# Patient Record
Sex: FEMALE | Race: WHITE | NOT HISPANIC OR LATINO | Employment: FULL TIME | URBAN - METROPOLITAN AREA
[De-identification: names, ages, dates, MRNs, and addresses within clinical notes are randomized per-mention and may not be internally consistent; named-entity substitution may affect disease eponyms.]

---

## 2023-03-09 ENCOUNTER — TELEPHONE (OUTPATIENT)
Dept: OBSTETRICS AND GYNECOLOGY | Facility: CLINIC | Age: 58
End: 2023-03-09
Payer: COMMERCIAL

## 2023-03-09 DIAGNOSIS — Z12.31 BREAST CANCER SCREENING BY MAMMOGRAM: Primary | ICD-10-CM

## 2023-03-09 DIAGNOSIS — Z01.419 WELL WOMAN EXAM WITH ROUTINE GYNECOLOGICAL EXAM: ICD-10-CM

## 2023-03-09 NOTE — TELEPHONE ENCOUNTER
----- Message from Jodee Hernandez sent at 3/9/2023  3:44 PM CST -----  Contact: Rhea Wilkerson is a new patient and would like to have orders for a mammogram and labs be placed in [facility] so she can schedule at the [facility] please.    Rhea can be reached at [phone] (home)      Thanks

## 2023-04-03 ENCOUNTER — OFFICE VISIT (OUTPATIENT)
Dept: OBSTETRICS AND GYNECOLOGY | Facility: CLINIC | Age: 58
End: 2023-04-03
Payer: COMMERCIAL

## 2023-04-03 VITALS
HEIGHT: 67 IN | DIASTOLIC BLOOD PRESSURE: 72 MMHG | SYSTOLIC BLOOD PRESSURE: 122 MMHG | BODY MASS INDEX: 31.11 KG/M2 | WEIGHT: 198.19 LBS

## 2023-04-03 DIAGNOSIS — N81.6 RECTOCELE: ICD-10-CM

## 2023-04-03 DIAGNOSIS — Z01.419 WELL WOMAN EXAM WITH ROUTINE GYNECOLOGICAL EXAM: Primary | ICD-10-CM

## 2023-04-03 PROCEDURE — 3008F BODY MASS INDEX DOCD: CPT | Mod: CPTII,S$GLB,, | Performed by: OBSTETRICS & GYNECOLOGY

## 2023-04-03 PROCEDURE — 3078F DIAST BP <80 MM HG: CPT | Mod: CPTII,S$GLB,, | Performed by: OBSTETRICS & GYNECOLOGY

## 2023-04-03 PROCEDURE — 99999 PR PBB SHADOW E&M-EST. PATIENT-LVL III: ICD-10-PCS | Mod: PBBFAC,,, | Performed by: OBSTETRICS & GYNECOLOGY

## 2023-04-03 PROCEDURE — 88175 CYTOPATH C/V AUTO FLUID REDO: CPT | Performed by: OBSTETRICS & GYNECOLOGY

## 2023-04-03 PROCEDURE — 1159F PR MEDICATION LIST DOCUMENTED IN MEDICAL RECORD: ICD-10-PCS | Mod: CPTII,S$GLB,, | Performed by: OBSTETRICS & GYNECOLOGY

## 2023-04-03 PROCEDURE — 1160F RVW MEDS BY RX/DR IN RCRD: CPT | Mod: CPTII,S$GLB,, | Performed by: OBSTETRICS & GYNECOLOGY

## 2023-04-03 PROCEDURE — 3074F PR MOST RECENT SYSTOLIC BLOOD PRESSURE < 130 MM HG: ICD-10-PCS | Mod: CPTII,S$GLB,, | Performed by: OBSTETRICS & GYNECOLOGY

## 2023-04-03 PROCEDURE — 87624 HPV HI-RISK TYP POOLED RSLT: CPT | Performed by: OBSTETRICS & GYNECOLOGY

## 2023-04-03 PROCEDURE — 99386 PREV VISIT NEW AGE 40-64: CPT | Mod: S$GLB,,, | Performed by: OBSTETRICS & GYNECOLOGY

## 2023-04-03 PROCEDURE — 99386 PR PREVENTIVE VISIT,NEW,40-64: ICD-10-PCS | Mod: S$GLB,,, | Performed by: OBSTETRICS & GYNECOLOGY

## 2023-04-03 PROCEDURE — 3008F PR BODY MASS INDEX (BMI) DOCUMENTED: ICD-10-PCS | Mod: CPTII,S$GLB,, | Performed by: OBSTETRICS & GYNECOLOGY

## 2023-04-03 PROCEDURE — 1160F PR REVIEW ALL MEDS BY PRESCRIBER/CLIN PHARMACIST DOCUMENTED: ICD-10-PCS | Mod: CPTII,S$GLB,, | Performed by: OBSTETRICS & GYNECOLOGY

## 2023-04-03 PROCEDURE — 3078F PR MOST RECENT DIASTOLIC BLOOD PRESSURE < 80 MM HG: ICD-10-PCS | Mod: CPTII,S$GLB,, | Performed by: OBSTETRICS & GYNECOLOGY

## 2023-04-03 PROCEDURE — 99999 PR PBB SHADOW E&M-EST. PATIENT-LVL III: CPT | Mod: PBBFAC,,, | Performed by: OBSTETRICS & GYNECOLOGY

## 2023-04-03 PROCEDURE — 3074F SYST BP LT 130 MM HG: CPT | Mod: CPTII,S$GLB,, | Performed by: OBSTETRICS & GYNECOLOGY

## 2023-04-03 PROCEDURE — 1159F MED LIST DOCD IN RCRD: CPT | Mod: CPTII,S$GLB,, | Performed by: OBSTETRICS & GYNECOLOGY

## 2023-04-03 RX ORDER — AZELASTINE 1 MG/ML
SPRAY, METERED NASAL
COMMUNITY
Start: 2023-03-10 | End: 2023-05-22

## 2023-04-03 RX ORDER — LEVOTHYROXINE SODIUM 75 UG/1
TABLET ORAL
COMMUNITY

## 2023-04-03 RX ORDER — ESTRADIOL 0.1 MG/G
CREAM VAGINAL
COMMUNITY
Start: 2023-02-16 | End: 2023-05-22

## 2023-04-03 RX ORDER — TRETINOIN 0.25 MG/G
CREAM TOPICAL
COMMUNITY

## 2023-04-03 RX ORDER — FLUTICASONE PROPIONATE 50 MCG
SPRAY, SUSPENSION (ML) NASAL
COMMUNITY
Start: 2023-03-10 | End: 2023-05-22

## 2023-04-03 NOTE — PROGRESS NOTES
Subjective:      Patient ID: Rhea Archuleta is a 57 y.o. female.    Chief Complaint:  Annual Exam      History of Present Illness  Annual Exam-Postmenopausal  Patient presents for annual exam. The patient reports history of vaginal prolapse. The patient is not sexually active. GYN screening history: last pap: was normal and last mammogram: was normal. History of LEEP and vulvar surgery secondary to MARK/NEELA over 10 yrs ago.  Was recently diagnosed with an anal/rectal fistula that resolved with antibiotics.  The patient is not taking hormone replacement therapy. Patient denies post-menopausal vaginal bleeding. The patient wears seatbelts: yes. The patient participates in regular exercise: yes. Has the patient ever been transfused or tattooed?: no. The patient reports that there is not domestic violence in her life.    GYN & OB History  No LMP recorded. Patient is postmenopausal.   Date of Last Pap: No result found    OB History    Para Term  AB Living   2 2 2     2   SAB IAB Ectopic Multiple Live Births                  # Outcome Date GA Lbr Luke/2nd Weight Sex Delivery Anes PTL Lv   2 Term            1 Term                Review of Systems  Review of Systems   Constitutional:  Negative for activity change, appetite change, chills, fatigue, fever and unexpected weight change.   Respiratory:  Negative for shortness of breath.    Cardiovascular:  Negative for chest pain, palpitations and leg swelling.   Gastrointestinal:  Negative for abdominal pain, bloating, blood in stool, constipation, diarrhea, nausea and vomiting.   Genitourinary:  Positive for frequency. Negative for dysuria, flank pain, genital sores, hematuria, hot flashes, menorrhagia, pelvic pain, urgency, vaginal bleeding, vaginal discharge, vaginal pain, urinary incontinence, postmenopausal bleeding, vaginal dryness and vaginal odor.   Musculoskeletal:  Negative for back pain.   Integumentary:  Negative for rash, breast mass, nipple  discharge, breast skin changes and breast tenderness.   Neurological:  Negative for syncope and headaches.   Breast: Negative for asymmetry, lump, mass, mastodynia, nipple discharge, skin changes and tenderness       Objective:     Physical Exam:   Constitutional: She is oriented to person, place, and time. She appears well-developed and well-nourished. No distress.    HENT:   Head: Normocephalic and atraumatic.    Eyes: Pupils are equal, round, and reactive to light. EOM are normal.     Cardiovascular:  Normal rate, regular rhythm and normal heart sounds.             Pulmonary/Chest: Effort normal and breath sounds normal.        Abdominal: Soft. Bowel sounds are normal. She exhibits no distension. There is no abdominal tenderness.     Genitourinary:    Vagina, uterus, right adnexa and left adnexa normal.            Pelvic exam was performed with patient supine.   There is no rash, tenderness, lesion or injury on the right labia. There is no rash, tenderness, lesion or injury on the left labia. Cervix is normal. Right adnexum displays no mass, no tenderness and no fullness. Left adnexum displays no mass, no tenderness and no fullness. There is rectocele (grade 2) and cystocele (grade 1) in the vagina. No erythema,  no vaginal discharge, tenderness or bleeding in the vagina.    No foreign body in the vagina.      No signs of injury in the vagina.   Cervix exhibits no motion tenderness, no discharge and no friability. Uterus is not deviated, not enlarged and not tender.           Musculoskeletal: Normal range of motion and moves all extremeties. No tenderness or edema.       Neurological: She is alert and oriented to person, place, and time.    Skin: Skin is warm and dry.    Psychiatric: She has a normal mood and affect. Her behavior is normal. Thought content normal.       Assessment:     1. Well woman exam with routine gynecological exam    2. Rectocele             Plan:     Well woman exam with routine  gynecological exam  -     Liquid-Based Pap Smear, Screening  -     HPV High Risk Genotypes, PCR  -     Pt was counseled on cervical/vaginal screening guidelines and recommendations.  If today's pap smear result is negative, next pap smear will be due in 3-5 yrs.  -     Pt was advised on current breast cancer screening recommendations.  Pt desires to proceed with screening MMG.  -     Follow up with PCP for routine health maintenance needs.    Rectocele  -     Grade 2 with very thin perineal body (secondary to tissue removal secondary to NEELA).  Pt counseled on options.  Desires to proceed with expectant management.      Follow up in about 1 year (around 4/3/2024).

## 2023-04-04 ENCOUNTER — TELEPHONE (OUTPATIENT)
Dept: OBSTETRICS AND GYNECOLOGY | Facility: CLINIC | Age: 58
End: 2023-04-04
Payer: COMMERCIAL

## 2023-04-04 DIAGNOSIS — N81.6 RECTOCELE: Primary | ICD-10-CM

## 2023-04-04 NOTE — TELEPHONE ENCOUNTER
"Called patient and she stated Dr. Gamez wanted to send her to someone in [facility] yesterday and she stated "I want to hold off".  Patient has changed her mind and would like to see provider in [facility].  Advised patient message would be sent to provider.  "

## 2023-04-04 NOTE — TELEPHONE ENCOUNTER
----- Message from Graciela Lowe sent at 4/4/2023 11:21 AM CDT -----  Patient is requesting the information for the doctor,  advised she would get referred to ,Please call back at [phone].Thanks

## 2023-04-11 LAB
HPV HR 12 DNA SPEC QL NAA+PROBE: NEGATIVE
HPV16 AG SPEC QL: NEGATIVE
HPV18 DNA SPEC QL NAA+PROBE: NEGATIVE

## 2023-04-14 ENCOUNTER — PATIENT MESSAGE (OUTPATIENT)
Dept: OBSTETRICS AND GYNECOLOGY | Facility: CLINIC | Age: 58
End: 2023-04-14
Payer: COMMERCIAL

## 2023-04-14 LAB
FINAL PATHOLOGIC DIAGNOSIS: NORMAL
Lab: NORMAL

## 2023-04-17 ENCOUNTER — HOSPITAL ENCOUNTER (OUTPATIENT)
Dept: RADIOLOGY | Facility: HOSPITAL | Age: 58
Discharge: HOME | End: 2023-04-17
Attending: OBSTETRICS & GYNECOLOGY
Payer: COMMERCIAL

## 2023-04-17 DIAGNOSIS — Z01.419 WELL WOMAN EXAM WITH ROUTINE GYNECOLOGICAL EXAM: ICD-10-CM

## 2023-04-17 DIAGNOSIS — Z12.31 BREAST CANCER SCREENING BY MAMMOGRAM: ICD-10-CM

## 2023-04-17 PROCEDURE — 77067 SCR MAMMO BI INCL CAD: CPT | Mod: TC

## 2023-04-17 PROCEDURE — 77063 MAMMO DIGITAL SCREENING BILAT WITH TOMO: ICD-10-PCS | Mod: 26,,, | Performed by: RADIOLOGY

## 2023-04-17 PROCEDURE — 77067 MAMMO DIGITAL SCREENING BILAT WITH TOMO: ICD-10-PCS | Mod: 26,,, | Performed by: RADIOLOGY

## 2023-04-17 PROCEDURE — 77067 SCR MAMMO BI INCL CAD: CPT | Mod: 26,,, | Performed by: RADIOLOGY

## 2023-04-17 PROCEDURE — 77063 BREAST TOMOSYNTHESIS BI: CPT | Mod: 26,,, | Performed by: RADIOLOGY

## 2023-05-05 ENCOUNTER — TELEPHONE (OUTPATIENT)
Dept: UROGYNECOLOGY | Facility: CLINIC | Age: 58
End: 2023-05-05
Payer: COMMERCIAL

## 2023-05-05 NOTE — TELEPHONE ENCOUNTER
Informed pt there is no sooner appt available but she is placed on the wait and will be notify when something becomes available. Pt voiced understanding and call ended.

## 2023-05-05 NOTE — TELEPHONE ENCOUNTER
----- Message from Maame Lau sent at 5/5/2023 11:24 AM CDT -----  Regarding: sooner appointment  Name of Who is Calling: Rhea           What is the request in detail: Patient is requesting ac all  back to be seen ASAP. She is having more problems and need to be seen.           Can the clinic reply by [facility]: Yes           What Number to Call Back if not in [facility]: [phone]

## 2023-05-22 ENCOUNTER — TELEPHONE (OUTPATIENT)
Dept: UROLOGY | Facility: CLINIC | Age: 58
End: 2023-05-22
Payer: COMMERCIAL

## 2023-05-22 ENCOUNTER — OFFICE VISIT (OUTPATIENT)
Dept: UROGYNECOLOGY | Facility: CLINIC | Age: 58
End: 2023-05-22
Payer: COMMERCIAL

## 2023-05-22 VITALS
WEIGHT: 195.75 LBS | DIASTOLIC BLOOD PRESSURE: 70 MMHG | HEIGHT: 67 IN | SYSTOLIC BLOOD PRESSURE: 140 MMHG | BODY MASS INDEX: 30.72 KG/M2

## 2023-05-22 DIAGNOSIS — N81.6 RECTOCELE: ICD-10-CM

## 2023-05-22 DIAGNOSIS — D07.1 VULVAR INTRAEPITHELIAL NEOPLASIA (VIN) GRADE 3: ICD-10-CM

## 2023-05-22 DIAGNOSIS — N81.11 CYSTOCELE, MIDLINE: ICD-10-CM

## 2023-05-22 DIAGNOSIS — N64.4 NIPPLE PAIN: Primary | ICD-10-CM

## 2023-05-22 DIAGNOSIS — L53.8 PERIANAL ERYTHEMA: ICD-10-CM

## 2023-05-22 PROCEDURE — 3008F PR BODY MASS INDEX (BMI) DOCUMENTED: ICD-10-PCS | Mod: CPTII,S$GLB,, | Performed by: OBSTETRICS & GYNECOLOGY

## 2023-05-22 PROCEDURE — 99999 PR PBB SHADOW E&M-EST. PATIENT-LVL IV: ICD-10-PCS | Mod: PBBFAC,,, | Performed by: OBSTETRICS & GYNECOLOGY

## 2023-05-22 PROCEDURE — 3077F SYST BP >= 140 MM HG: CPT | Mod: CPTII,S$GLB,, | Performed by: OBSTETRICS & GYNECOLOGY

## 2023-05-22 PROCEDURE — 3077F PR MOST RECENT SYSTOLIC BLOOD PRESSURE >= 140 MM HG: ICD-10-PCS | Mod: CPTII,S$GLB,, | Performed by: OBSTETRICS & GYNECOLOGY

## 2023-05-22 PROCEDURE — 1159F PR MEDICATION LIST DOCUMENTED IN MEDICAL RECORD: ICD-10-PCS | Mod: CPTII,S$GLB,, | Performed by: OBSTETRICS & GYNECOLOGY

## 2023-05-22 PROCEDURE — 99215 OFFICE O/P EST HI 40 MIN: CPT | Mod: S$GLB,,, | Performed by: OBSTETRICS & GYNECOLOGY

## 2023-05-22 PROCEDURE — 1159F MED LIST DOCD IN RCRD: CPT | Mod: CPTII,S$GLB,, | Performed by: OBSTETRICS & GYNECOLOGY

## 2023-05-22 PROCEDURE — 1160F RVW MEDS BY RX/DR IN RCRD: CPT | Mod: CPTII,S$GLB,, | Performed by: OBSTETRICS & GYNECOLOGY

## 2023-05-22 PROCEDURE — 3008F BODY MASS INDEX DOCD: CPT | Mod: CPTII,S$GLB,, | Performed by: OBSTETRICS & GYNECOLOGY

## 2023-05-22 PROCEDURE — 99215 PR OFFICE/OUTPT VISIT, EST, LEVL V, 40-54 MIN: ICD-10-PCS | Mod: S$GLB,,, | Performed by: OBSTETRICS & GYNECOLOGY

## 2023-05-22 PROCEDURE — 99999 PR PBB SHADOW E&M-EST. PATIENT-LVL IV: CPT | Mod: PBBFAC,,, | Performed by: OBSTETRICS & GYNECOLOGY

## 2023-05-22 PROCEDURE — 3078F PR MOST RECENT DIASTOLIC BLOOD PRESSURE < 80 MM HG: ICD-10-PCS | Mod: CPTII,S$GLB,, | Performed by: OBSTETRICS & GYNECOLOGY

## 2023-05-22 PROCEDURE — 1160F PR REVIEW ALL MEDS BY PRESCRIBER/CLIN PHARMACIST DOCUMENTED: ICD-10-PCS | Mod: CPTII,S$GLB,, | Performed by: OBSTETRICS & GYNECOLOGY

## 2023-05-22 PROCEDURE — 3078F DIAST BP <80 MM HG: CPT | Mod: CPTII,S$GLB,, | Performed by: OBSTETRICS & GYNECOLOGY

## 2023-05-22 RX ORDER — LORAZEPAM 0.5 MG/1
TABLET ORAL
COMMUNITY

## 2023-05-22 NOTE — PROGRESS NOTES
[facility] - UROGYNECOLOGY  [address] [address]    Rhea Archuleta  [id number]  1965  May 24, 2023    Consulting Physician: Tong Gamez MD   GYN: MD Franco  Primary M.D.: Primary Doctor No    Chief Complaint   Patient presents with    consult     Fistual/Discuss surgery       HPI:     1)  UI:  (--) SENIA = (--) UUI  X 0 days.  (--) pads:0/day.  Daytime frequency: Q 5 hours.  Nocturia: Yes: 2/night.   (--) dysuria,  (--) hematuria,  (--) frequent UTIs.  (--) complete bladder emptying.     2)  POP:  Absent.  Symptoms:(--).  (--) vaginal bleeding. (--) vaginal discharge. (--) sexually active.  (--) dyspareunia.   (--)  Vaginal dryness.  (--) vaginal estrogen use.     3)  BM:  (--) constipation/straining.  (--) chronic diarrhea. (--) hematochezia.  (+) fecal incontinence - 1 episode one week ago.  (+) fecal smearing/urgency.  (--) complete evacuation.  +vaginal splinting.     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   No rim-enhancing, drainable soft tissue abscess.  --has h/o perianal abscesses--feels like pain in vagina, anus, back   --can't tell if drains intermittently   --intermittently gets larger, feels like hard ball  --would get larger, then decrease in size with ABX  --did have some yellow, mucoid discharge with passage of BM during last inflammation  --saw Vinnie Khan MD (CRS at [facility])    Discussion:  1) The treatment and pathophysiology of perirectal abscesses and fistula in ano were discussed with the patient.     We discussed incision and drainage (with potential seton placement if a fistula is identified).     We discussed reasons for seton placement, namely to help the abscess cavity drain and promote inflammation/healing. The seton can be removed at a later date and a definitive procedure can be undertaken at that  time.    We discussed primary fistulotomy if there is not a significant amount of tissue and muscle involved.    2) We discussed seton placement and secondary fistulotomy after maturation of the fistula tract. We discussed the risks of surgery including pain, bleeding, and recurrence. We also discussed the risk of incontinence. If there is a significant amount of anal sphincter involved we will not perform fistulotomy.    --OR 5-4-2023 (Bill):   1)  EUA  Procedure Details   The patient was brought into the operating room. General endotracheal anesthesia was induced and she tolerated this well. She was placed in the prone position and all pressure points were padded.    We performed a local block with 20 mL of half percent Marcaine after prepping and draping the perineal area out sterilely.    Digital rectal exam revealed no masses. There was no abscess no internal opening appreciated after inserting a Degroot retractor. In terms of palpation, there was some fullness right lateral and right anterior.I Used a finder needle but did not locate any abscess. I suspect this was scar tissue. Anterior/left anterior there was a small tag in the anal canal which may represent a previous rectovaginal fistula. There was no internal opening seen.    I inserted a speculum in the vagina and there was no fistula, opening, or mass seen.    We irrigated the anal canal. The patient tolerated the procedure well and was transferred in stable condition to the PACU.      Past Medical History  Past Medical History:   Diagnosis Date    Abnormal Pap smear of cervix     Thyroid disease    --h/o NEELA 3: thinned PB s/p WLE (2014); then had multiple more WLEs and topical Tx    Past Surgical History  Past Surgical History:   Procedure Laterality Date    CERVICAL BIOPSY  W/ LOOP ELECTRODE EXCISION      TUBAL LIGATION     --WLE for NEELA 3 2014; then had multiple more WLEs and topical Tx (Efren Rollins in [place])  --6/27/10 single incision BARD  MUS anchored to transobsturator as well as lap BTL    Hysterectomy: No    Past Ob History     x 2.  C/s x 0.    Largest infant weight: 7 lbs 6 oz  yes FAVD. yes episiotomy.      Gynecologic History  LMP: No LMP recorded. Patient is postmenopausal.  Age of menarche: 19  Age of menopause: 52   Menstrual history: normal cycle, light bleeding   Pap test: 2023 normal, HPV NEG.  History of abnormal paps: unknown--had WLE mult for VIN3.   History of STIs:  Yes - chlamydial, treated.   Mammogram: Date of last: 2023.  Result: Normal  Colonoscopy: Date of last: .  Result:  normal.  Repeat due:  unknown.    DEXA:  Date of last: unknown.  Result:  unknown.  Repeat due:  unknown.     Family History  Family History   Problem Relation Age of Onset    Hypertension Mother     Osteoporosis Mother       Colon CA: No  Breast CA: No  GYN CA: sister had stage 1 uterine cancer    CA: No    Social History  Social History     Tobacco Use   Smoking Status Never   Smokeless Tobacco Never   .  Cessation date: May 2019.  PPD:  1 x 10-15 years.   Social History     Substance and Sexual Activity   Alcohol Use Not Currently   .    Social History     Substance and Sexual Activity   Drug Use Never     The patient is single.  Resides in [place] [address].  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      Allergies  Review of patient's allergies indicates:   Allergen Reactions    Clarithromycin Hives and Other (See Comments)     unknown         Medications  Current Outpatient Medications on File Prior to Visit   Medication Sig Dispense Refill    levothyroxine (SYNTHROID) 75 MCG tablet levothyroxine 75 mcg tablet      LORazepam (ATIVAN) 0.5 MG tablet lorazepam 0.5 mg tablet   Take 1 tablet twice a day by oral route as needed for 30 days.      tretinoin (RETIN-A) 0.025 % cream tretinoin 0.025 % topical cream   APPLY A PEA SIZED AMOUNT AT BEDTIME. BEGIN 2-3 TIMES A WEEK, THEN INCREASE AS TOLERATED       No current  "facility-administered medications on file prior to visit.       Review of Systems A 14 point ROS was reviewed with pertinent positives as noted above in the history of present illness.      Constitutional: negative  Eyes: negative  Endocrine: negative  Gastrointestinal: negative  Cardiovascular: negative  Respiratory: negative  Allergic/Immunologic: negative  Integumentary: negative  Psychiatric: negative  Musculoskeletal: negative   Ear/Nose/Throat: negative  Neurologic: negative  Genitourinary: SEE HPI  Hematologic/Lymphatic: negative   Breast: negative    Urogynecologic Exam  BP (!) 140/70 (BP Location: Right arm, Patient Position: Sitting, BP Method: Large (Manual))   Ht 5' 7" (1.702 m)   Wt 88.8 kg (195 lb 12.3 oz)   BMI 30.66 kg/m²     GENERAL APPEARANCE:  The patient is well-developed, well-nourished.   Neck:  Supple with no thyromegaly, no carotid bruits.  Heart:  Regular rate and rhythm, no murmurs, rubs or gallops.  Lungs:  Clear.  No CVA tenderness.  Abdomen:  Soft, nontender, nondistended, no hepatosplenomegaly.  Incisions:  none    PELVIC:    External genitalia:  Normal Bartholins, Skenes and labia bilaterally.  PB very thinned with some mild hypopigmentation vs scarring. Labia minora partially regressed vs affected by previous WLE.   Urethra:  No caruncle, diverticulum or masses.  (+) hypermobility.    Vagina:  Atrophy (+) , no bladder masses or tender, no discharge.  Area a 7 o'clock just cephalad to hymenal remnant that feels connected to perianal cord of scar tissue lateral to this. Able to palpate cord between 2nd finger and thumb.  Vaginal wall very thin in this area.  +TTP. No fluctuance/ostia/drainage externally. Patient states this "cord" swells to size of drinking straw intermittently.   Cervix:  normal appearance  Uterus: normal size, contour, position, consistency, mobility, non-tender  Adnexa: Not palpable.    POP-Q:  Aa 0; Ba 0; C -6; Ap 0; Bp 0; D -7.  Genital hiatus 3, perineal body " 2, total vaginal length 10-11.    NEUROLOGIC:  Cranial nerves 2 through 12 intact.  Strength 5/5.  DTRs 2+ lower extremities.  S2 through 4 normal.  Sacral reflexes intact.    EXT: LARIOS, 2+ pulses bilaterally, no C/C/E    COUGH STRESS TEST:  negative  KEGEL: 1 /5    RECTAL:    External:  Normal, (+) hemorrhoids--old external, non-thrombosed, (--) dovetailing.   Internal:   (--) tenderness, (--) masses, Normal resting tone, Normal active tone. Area of perianal cording feels cephalad to EAS.     Impression    1. Nipple pain    2. Rectocele    3. Vulvar intraepithelial neoplasia (NEELA) grade 3    4. Cystocele, midline    5. Perianal erythema        Initial Plan  The patient was counseled regarding these issues. The patient was given a summary sheet containing each of these issues with possible options for evaluation and management. When appropriate, we also reviewed computer-generated diagrams specific to their diagnoses..  All questions were addressed to the patient's satisfaction.    1)  h/o NEELA 3 s/p mult WLE vs L vulvectomy:  --gyn onc follow up to est any needed follow up plan  --2023 pap normal/HPV  --restart vaginal estrogen cream:  A)  Vaginal estrogen:  --Use 0.5 grams of estrogen cream in vagina with applicator or dime-sized amount with finger (as far as can reach internally) nightly x 2 weeks, then twice a week thereafter.  You can also apply a dime-sized amount with your finger around the vaginal opening and inner lips at same frequency.     --Vaginal estrogen may help to decrease pain related to dryness with intercourse and urinary symptoms (urgency/frequency/UTIs) around menopause.    https://www.yourpelvicfloor.org/media/Low-Dose_Vaginal_Estrogen_Therapy.pdf    2)  Stage 2 cystocele/rectocele:  --discussed  --this is different than the intermittent perianal bulge you feel that tracks to vagina  --CTM for now    3)  concern for fistula in ano:  --not active currently so difficult to ID on exam   --does feel  like scar tissue cord tracks from L perianal area to vagina (7 o'clock)   --BR CRS did EUA 5-2023 without obvious tract/fistula noted  --will discuss with CRS--should we wait until area enlarged again so can image with MRI/define VS CRS plan EUA/scope to evaluate?  Addendum: I spoke with Dr. Hoover.   He would CTM and, if recurs, get appt with CRS ASAP for re-eval.  I spoke with patient and relayed this message. She will let us know if flares again.     4)  R nipple pain with ? Very small nodule:  --4/2023 MMG normal  --get R breast US    5)  Get R breast US and see GYN ONC.     Approximately 60 min were spent in consult, 90 % in discussion.     Thank you for requesting consultation of your patient.  I look forward to participating in their care.    Maribell Manzano  Female Pelvic Medicine and Reconstructive Surgery  [facility] [place], [place]

## 2023-05-22 NOTE — TELEPHONE ENCOUNTER
[facility]  ----- Message from Mayuri Julio sent at 5/22/2023  9:32 AM CDT -----  Pt would like to know if you have received her records from a doctor Vinnie Khan pt# [phone]. She does not want to come to appt. If you do not have records.

## 2023-05-22 NOTE — PATIENT INSTRUCTIONS
1)  h/o NEELA 3 s/p mult WLE vs L vulvectomy:  --gyn onc follow up to est any needed follow up plan  --2023 pap normal/HPV  --restart vaginal estrogen cream:  A)  Vaginal estrogen:  --Use 0.5 grams of estrogen cream in vagina with applicator or dime-sized amount with finger (as far as can reach internally) nightly x 2 weeks, then twice a week thereafter.  You can also apply a dime-sized amount with your finger around the vaginal opening and inner lips at same frequency.     --Vaginal estrogen may help to decrease pain related to dryness with intercourse and urinary symptoms (urgency/frequency/UTIs) around menopause.    https://www.yourpelvicfloor.org/media/Low-Dose_Vaginal_Estrogen_Therapy.pdf    2)  Stage 2 cystocele/rectocele:  --discussed  --this is different than the intermittent perianal bulge you feel that tracks to vagina  --CTM for now    3)  concern for fistula in ano:  --not active currently so difficult to ID on exam   --does feel like scar tissue cord tracks from L perianal area to vagina (7 o'clock)   --will discuss with CRS--should we wait until area enlarged again so can image with MRI/define VS plan EUA/scope to evaluate    4)  R nipple pain with ? Very small nodule:  --4/2023 MMG normal  --get R breast US    5)  will let you know what CRS says.  Get R breast US and see GYN ONC.

## 2023-05-22 NOTE — TELEPHONE ENCOUNTER
OP note/records in care anywhere.  ----- Message from Nuha Washington sent at 5/22/2023 10:16 AM CDT -----  Type:  Patient Returning Call      Who Called:   ANTONIO POLLARD [[id number]]      Who Left Message for Patient: Radha Ocampo RN       Does the patient know what this is regarding?: Yes      Would the patient rather a call back or a response via [facility]?  Call back       Best Call Back Number: [phone] (home)       Additional Information:

## 2023-05-23 ENCOUNTER — TELEPHONE (OUTPATIENT)
Dept: GYNECOLOGIC ONCOLOGY | Facility: CLINIC | Age: 58
End: 2023-05-23
Payer: COMMERCIAL

## 2023-05-23 ENCOUNTER — TELEPHONE (OUTPATIENT)
Dept: UROGYNECOLOGY | Facility: CLINIC | Age: 58
End: 2023-05-23
Payer: COMMERCIAL

## 2023-05-23 RX ORDER — ESTRADIOL 0.1 MG/G
CREAM VAGINAL
Qty: 42.5 G | Refills: 3 | Status: SHIPPED | OUTPATIENT
Start: 2023-05-23

## 2023-05-23 NOTE — TELEPHONE ENCOUNTER
Spoke with our patient about her insurance, schedule appointment she voiced understanding of the date, time and location. All questions answered  Provider Scheduling Coord.  Gynecologic Oncology MA/PAR /Preceptor Shaquille Porras

## 2023-05-23 NOTE — TELEPHONE ENCOUNTER
----- Message from Parviz Monterroso sent at 5/23/2023  6:47 AM CDT -----  Name Of Caller: Rhea        Provider Name: needs to est care        Does patient feel the need to be seen today? no        Relationship to the Pt?: patient        Contact Preference?: [phone]        What is the nature of the call?: Patient has a referral that was placed in the system on 5- by Dr Molly Manzano for D07.1 (ICD-10-CM) - Vulvar intraepithelial neoplasia (NEELA) grade, patient would like to get an appointment scheduled.

## 2023-05-23 NOTE — TELEPHONE ENCOUNTER
----- Message from Todd No sent at 5/23/2023  8:46 AM CDT -----  Regarding: Refill Request    Who Called: Patient        New Prescription or Refill : Refill    RX Name and Strength:   estradioL (ESTRACE) 0.01 % (0.1 mg/gram) vaginal cream (Discontinued)      RX Name and Strength:         RX Name and Strength:      30 day or 90 day RX:     Preferred Pharmacy:[facility]/pharmacy #[id number] - [place] [address].    Would the patient rather a call back or a response via [facility]?    Best Call Back Number:  [phone]    Additional Information: patient is out of Rx

## 2023-05-24 PROBLEM — N64.4 NIPPLE PAIN: Status: ACTIVE | Noted: 2023-05-24

## 2023-05-25 ENCOUNTER — OFFICE VISIT (OUTPATIENT)
Dept: GYNECOLOGIC ONCOLOGY | Facility: CLINIC | Age: 58
End: 2023-05-25
Payer: COMMERCIAL

## 2023-05-25 VITALS
DIASTOLIC BLOOD PRESSURE: 65 MMHG | BODY MASS INDEX: 30.2 KG/M2 | HEART RATE: 68 BPM | HEIGHT: 67 IN | SYSTOLIC BLOOD PRESSURE: 137 MMHG | WEIGHT: 192.44 LBS

## 2023-05-25 DIAGNOSIS — D07.1 VULVAR INTRAEPITHELIAL NEOPLASIA (VIN) GRADE 3: ICD-10-CM

## 2023-05-25 PROCEDURE — 99999 PR PBB SHADOW E&M-EST. PATIENT-LVL III: ICD-10-PCS | Mod: PBBFAC,,, | Performed by: STUDENT IN AN ORGANIZED HEALTH CARE EDUCATION/TRAINING PROGRAM

## 2023-05-25 PROCEDURE — 1159F PR MEDICATION LIST DOCUMENTED IN MEDICAL RECORD: ICD-10-PCS | Mod: CPTII,S$GLB,, | Performed by: STUDENT IN AN ORGANIZED HEALTH CARE EDUCATION/TRAINING PROGRAM

## 2023-05-25 PROCEDURE — 1159F MED LIST DOCD IN RCRD: CPT | Mod: CPTII,S$GLB,, | Performed by: STUDENT IN AN ORGANIZED HEALTH CARE EDUCATION/TRAINING PROGRAM

## 2023-05-25 PROCEDURE — 3075F SYST BP GE 130 - 139MM HG: CPT | Mod: CPTII,S$GLB,, | Performed by: STUDENT IN AN ORGANIZED HEALTH CARE EDUCATION/TRAINING PROGRAM

## 2023-05-25 PROCEDURE — 3008F BODY MASS INDEX DOCD: CPT | Mod: CPTII,S$GLB,, | Performed by: STUDENT IN AN ORGANIZED HEALTH CARE EDUCATION/TRAINING PROGRAM

## 2023-05-25 PROCEDURE — 3078F PR MOST RECENT DIASTOLIC BLOOD PRESSURE < 80 MM HG: ICD-10-PCS | Mod: CPTII,S$GLB,, | Performed by: STUDENT IN AN ORGANIZED HEALTH CARE EDUCATION/TRAINING PROGRAM

## 2023-05-25 PROCEDURE — 99205 PR OFFICE/OUTPT VISIT, NEW, LEVL V, 60-74 MIN: ICD-10-PCS | Mod: S$GLB,,, | Performed by: STUDENT IN AN ORGANIZED HEALTH CARE EDUCATION/TRAINING PROGRAM

## 2023-05-25 PROCEDURE — 99999 PR PBB SHADOW E&M-EST. PATIENT-LVL III: CPT | Mod: PBBFAC,,, | Performed by: STUDENT IN AN ORGANIZED HEALTH CARE EDUCATION/TRAINING PROGRAM

## 2023-05-25 PROCEDURE — 3008F PR BODY MASS INDEX (BMI) DOCUMENTED: ICD-10-PCS | Mod: CPTII,S$GLB,, | Performed by: STUDENT IN AN ORGANIZED HEALTH CARE EDUCATION/TRAINING PROGRAM

## 2023-05-25 PROCEDURE — 3075F PR MOST RECENT SYSTOLIC BLOOD PRESS GE 130-139MM HG: ICD-10-PCS | Mod: CPTII,S$GLB,, | Performed by: STUDENT IN AN ORGANIZED HEALTH CARE EDUCATION/TRAINING PROGRAM

## 2023-05-25 PROCEDURE — 99205 OFFICE O/P NEW HI 60 MIN: CPT | Mod: S$GLB,,, | Performed by: STUDENT IN AN ORGANIZED HEALTH CARE EDUCATION/TRAINING PROGRAM

## 2023-05-25 PROCEDURE — 3078F DIAST BP <80 MM HG: CPT | Mod: CPTII,S$GLB,, | Performed by: STUDENT IN AN ORGANIZED HEALTH CARE EDUCATION/TRAINING PROGRAM

## 2023-05-25 NOTE — PROGRESS NOTES
Referring Provider:  Maribell Manzano MD  [address] [place] [address]   Subjective:      Patient ID: Rhea Archuleta is a 57 y.o. female.    Chief Complaint: No chief complaint on file.    Problem List Items Addressed This Visit          Oncology    Vulvar intraepithelial neoplasia (NEELA) grade 3      HPI Reports a history of multiple vulvar resections for VIN3 in [place]. Prior history of smoking and HPV. Stopped smoking in 2019. Last pap with HPV with NILM and negative HPV. Denies new symptomatic lesions but reports she avoids self exams. Has had recurrent issues with perineal abscess and possible rectovaginal fistula.    Present to discuss surveillance plan given history of VIN3.     Review of Systems   Constitutional:  Negative for chills, fatigue and fever.   Respiratory:  Negative for cough and shortness of breath.    Cardiovascular:  Negative for chest pain.   Gastrointestinal:  Negative for abdominal distention, abdominal pain, constipation and diarrhea.   Genitourinary:  Negative for dysuria, pelvic pain and vaginal bleeding.   Musculoskeletal:  Negative for back pain.   Psychiatric/Behavioral:  Negative for dysphoric mood. The patient is not nervous/anxious.    Past Medical History:   Diagnosis Date    Abnormal Pap smear of cervix     Thyroid disease       Past Surgical History:   Procedure Laterality Date    CERVICAL BIOPSY  W/ LOOP ELECTRODE EXCISION      TUBAL LIGATION        Family History   Problem Relation Age of Onset    Hypertension Mother     Osteoporosis Mother       Social History     Socioeconomic History    Marital status: Single        Objective:      There were no vitals filed for this visit.   Physical Exam  Constitutional:       General: She is not in acute distress.  HENT:      Head: Normocephalic.   Eyes:      Extraocular Movements: Extraocular movements intact.      Conjunctiva/sclera: Conjunctivae normal.   Cardiovascular:      Rate and Rhythm: Normal rate.       Pulses: Normal pulses.   Pulmonary:      Effort: Pulmonary effort is normal. No respiratory distress.      Breath sounds: No wheezing.   Abdominal:      General: There is no distension.      Tenderness: There is no abdominal tenderness. There is no guarding or rebound.   Genitourinary:     Comments: External genitalia normal. Vulva without visible lesions. Perineal body thin and atrophic. Vagina well ruggated. Cervix with no visible lesions.  Musculoskeletal:         General: No deformity.   Neurological:      Mental Status: She is alert and oriented to person, place, and time.   Psychiatric:         Mood and Affect: Mood normal.         Behavior: Behavior normal.         Thought Content: Thought content normal.       No results found for: WBC, HGB, HCT, MCV, PLT     Assessment:       Vulvar intraepithelial neoplasia (NEELA) grade 3  -     Ambulatory referral/consult to Gynecologic Oncology         Plan:       History VIN3: Reviewed etiology of vulvar dysplasia and reviewed by HPV and Lichens sclerosis as potential causes. Encouragingly, recent pap with HPV test was negative for HPV.    Recommend continued surveillance with clinical exams and inspection of the vulva every 6 months for the next year.   If no new lesions, during that timeframe, could space to annual visits.   Recommend biopsy of any suspicious new or symptomatic lesions.     2. Vaginal atrophy: ok to continue use estrace cream as directed by Dr. Manzano.    I spent 60 minutes on todays encounter including care coordination, face to face counseling, records review, and communication with her other physicians.         Dev Draper MD

## 2023-05-25 NOTE — Clinical Note
Thanks for sending Rhea to see me. She's a very sweet lady. I did not see any suspicious lesions and recommend surveillance with clinical exams. I plan to message Dr. Gamez with these recommendations.  Thanks again, Dev

## 2023-06-14 ENCOUNTER — TELEPHONE (OUTPATIENT)
Dept: UROGYNECOLOGY | Facility: CLINIC | Age: 58
End: 2023-06-14
Payer: COMMERCIAL

## 2023-06-14 DIAGNOSIS — N64.4 NIPPLE PAIN: Primary | ICD-10-CM

## 2023-06-14 DIAGNOSIS — R92.8 ABNORMAL MAMMOGRAM: Primary | ICD-10-CM

## 2023-06-14 NOTE — TELEPHONE ENCOUNTER
----- Message from Lelo oSto sent at 6/14/2023  9:16 AM CDT -----  Good Morning!    Will you please order A Diagnostic Mammogram right w/ melvin for this patient?    Thank You,    Lelo PHILLIP  Radiology Dept

## 2023-06-14 NOTE — TELEPHONE ENCOUNTER
done    ----- Message from Lelo Soto sent at 6/14/2023  9:16 AM CDT -----  Good Morning!    Will you please order A Diagnostic Mammogram right w/ melvin for this patient?    Thank You,    Lelo PHILLIP  Radiology Dept

## 2023-08-08 ENCOUNTER — HOSPITAL ENCOUNTER (OUTPATIENT)
Dept: RADIOLOGY | Facility: HOSPITAL | Age: 58
Discharge: HOME | End: 2023-08-08
Attending: OBSTETRICS & GYNECOLOGY
Payer: COMMERCIAL

## 2023-08-08 DIAGNOSIS — R92.8 ABNORMAL MAMMOGRAM: ICD-10-CM

## 2024-07-08 ENCOUNTER — TELEPHONE (OUTPATIENT)
Dept: OBSTETRICS AND GYNECOLOGY | Facility: CLINIC | Age: 59
End: 2024-07-08
Payer: COMMERCIAL

## 2024-07-08 NOTE — TELEPHONE ENCOUNTER
----- Message from Facundo Panchal sent at 7/8/2024  9:20 AM CDT -----  Contact: Rhea  .Type:  Same Day Appointment Request    Caller is requesting a same day appointment.  Caller declined first available appointment listed below.    Name of Caller: Rhea   When is the first available appointment? No dates   Symptoms: lump near vaginal region (bigger than a pea and soft), being there a week   Best Call Back Number:  [phone]    Additional Information:      Thanks

## 2024-07-09 ENCOUNTER — OFFICE VISIT (OUTPATIENT)
Dept: OBSTETRICS AND GYNECOLOGY | Facility: CLINIC | Age: 59
End: 2024-07-09
Payer: COMMERCIAL

## 2024-07-09 VITALS
DIASTOLIC BLOOD PRESSURE: 68 MMHG | WEIGHT: 194.69 LBS | SYSTOLIC BLOOD PRESSURE: 130 MMHG | HEIGHT: 67 IN | BODY MASS INDEX: 30.56 KG/M2

## 2024-07-09 DIAGNOSIS — L73.9 FOLLICULITIS: Primary | ICD-10-CM

## 2024-07-09 DIAGNOSIS — N81.6 RECTOCELE: ICD-10-CM

## 2024-07-09 DIAGNOSIS — L53.8 PERIANAL ERYTHEMA: ICD-10-CM

## 2024-07-09 PROCEDURE — 99213 OFFICE O/P EST LOW 20 MIN: CPT | Mod: S$GLB,,, | Performed by: OBSTETRICS & GYNECOLOGY

## 2024-07-09 PROCEDURE — 1159F MED LIST DOCD IN RCRD: CPT | Mod: CPTII,S$GLB,, | Performed by: OBSTETRICS & GYNECOLOGY

## 2024-07-09 PROCEDURE — 3008F BODY MASS INDEX DOCD: CPT | Mod: CPTII,S$GLB,, | Performed by: OBSTETRICS & GYNECOLOGY

## 2024-07-09 PROCEDURE — 1160F RVW MEDS BY RX/DR IN RCRD: CPT | Mod: CPTII,S$GLB,, | Performed by: OBSTETRICS & GYNECOLOGY

## 2024-07-09 PROCEDURE — 99999 PR PBB SHADOW E&M-EST. PATIENT-LVL III: CPT | Mod: PBBFAC,,, | Performed by: OBSTETRICS & GYNECOLOGY

## 2024-07-09 PROCEDURE — 3075F SYST BP GE 130 - 139MM HG: CPT | Mod: CPTII,S$GLB,, | Performed by: OBSTETRICS & GYNECOLOGY

## 2024-07-09 PROCEDURE — 3078F DIAST BP <80 MM HG: CPT | Mod: CPTII,S$GLB,, | Performed by: OBSTETRICS & GYNECOLOGY

## 2024-07-09 RX ORDER — SULFAMETHOXAZOLE AND TRIMETHOPRIM 800; 160 MG/1; MG/1
1 TABLET ORAL 2 TIMES DAILY
Qty: 14 TABLET | Refills: 0 | Status: SHIPPED | OUTPATIENT
Start: 2024-07-09 | End: 2024-07-16

## 2024-07-09 RX ORDER — BUPROPION HYDROCHLORIDE 150 MG/1
1 TABLET ORAL DAILY
COMMUNITY

## 2024-07-09 NOTE — PROGRESS NOTES
Subjective     Patient ID: Rhea Archuleta is a 59 y.o. female.    Chief Complaint:  vaginal bump      History of Present Illness  HPI  Pt reports complaints of noting a bump on her upper left labia minora near her clitoris.  It has been present for several days and does not hurt.  Pt tried to pop it yesterday but nothing came out.  Pt is concerned that it may be related to her anal fistula.  Pt also reports continued flares with her fistula and would like to discuss options.  Pt currently sees Colorectal Surgery and has see Urogyn and Gyn Onc (history of NEELA 3).      GYN & OB History  No LMP recorded. Patient is postmenopausal.   Date of Last Pap: 2023    OB History    Para Term  AB Living   2 2 2     2   SAB IAB Ectopic Multiple Live Births                  # Outcome Date GA Lbr Luke/2nd Weight Sex Type Anes PTL Lv   2 Term            1 Term                Review of Systems  Review of Systems   Constitutional:  Negative for activity change, appetite change, chills, fatigue, fever and unexpected weight change.   Respiratory:  Negative for shortness of breath.    Cardiovascular:  Negative for chest pain, palpitations and leg swelling.   Gastrointestinal:  Negative for abdominal pain, bloating, blood in stool, constipation, diarrhea, nausea and vomiting.   Genitourinary:  Positive for genital sores. Negative for dysuria, flank pain, frequency, hematuria, pelvic pain, urgency, vaginal bleeding, vaginal discharge, vaginal pain, urinary incontinence, postcoital bleeding, vaginal dryness and vaginal odor.   Musculoskeletal:  Negative for back pain.   Neurological:  Negative for syncope and headaches.          Objective   Physical Exam:   Constitutional: She is oriented to person, place, and time. She appears well-developed and well-nourished. No distress.       Cardiovascular:  Normal rate and regular rhythm.             Pulmonary/Chest: Effort normal and breath sounds normal.        Abdominal: Soft.  Bowel sounds are normal. She exhibits no distension. There is no abdominal tenderness.     Genitourinary:    Vagina and uterus normal.            Pelvic exam was performed with patient supine.   There is no rash, tenderness, lesion or injury on the right labia. There is lesion on the left labia. There is no rash, tenderness or injury on the left labia. Cervix is normal. Right adnexum displays no mass, no tenderness and no fullness. Left adnexum displays no mass, no tenderness and no fullness. No erythema, vaginal discharge, tenderness or bleeding in the vagina.    No foreign body in the vagina.      No signs of injury in the vagina.   Cervix exhibits no motion tenderness, no discharge and no friability. Uterus is not deviated, not enlarged and not tender.           Musculoskeletal: Normal range of motion and moves all extremeties. No tenderness or edema.       Neurological: She is alert and oriented to person, place, and time.    Skin: Skin is warm and dry.    Psychiatric: She has a normal mood and affect. Her behavior is normal. Thought content normal.            Assessment and Plan     1. Folliculitis    2. Rectocele    3. Perianal erythema           Plan:  Folliculitis  -     sulfamethoxazole-trimethoprim 800-160mg (BACTRIM DS) 800-160 mg Tab; Take 1 tablet by mouth 2 (two) times daily. for 7 days  Dispense: 14 tablet; Refill: 0  -     Medication details, dosing, risks, side-effects, and interactions were discussed.  -     Epsom salt soaks BID-TID.    Rectocele  -     Ambulatory referral/consult to Urogynecology; Future; Expected date: 07/16/2024  -     Pt is seeing Colorectal Surgeon and has seen Urogyn in the past but has not follow up since.  Requests referral to Urogyn in [place].    Perianal erythema      Follow up for Annual exam.

## 2024-07-16 ENCOUNTER — OFFICE VISIT (OUTPATIENT)
Dept: UROGYNECOLOGY | Facility: CLINIC | Age: 59
End: 2024-07-16
Payer: COMMERCIAL

## 2024-07-16 VITALS
DIASTOLIC BLOOD PRESSURE: 75 MMHG | HEART RATE: 73 BPM | BODY MASS INDEX: 30.31 KG/M2 | HEIGHT: 67 IN | SYSTOLIC BLOOD PRESSURE: 129 MMHG | WEIGHT: 193.13 LBS

## 2024-07-16 DIAGNOSIS — R10.2 PELVIC PRESSURE IN FEMALE: ICD-10-CM

## 2024-07-16 DIAGNOSIS — K60.3 FISTULA-IN-ANO: Primary | ICD-10-CM

## 2024-07-16 PROCEDURE — 99214 OFFICE O/P EST MOD 30 MIN: CPT | Mod: S$GLB,,, | Performed by: OBSTETRICS & GYNECOLOGY

## 2024-07-16 PROCEDURE — 3074F SYST BP LT 130 MM HG: CPT | Mod: CPTII,S$GLB,, | Performed by: OBSTETRICS & GYNECOLOGY

## 2024-07-16 PROCEDURE — 3078F DIAST BP <80 MM HG: CPT | Mod: CPTII,S$GLB,, | Performed by: OBSTETRICS & GYNECOLOGY

## 2024-07-16 PROCEDURE — 99999 PR PBB SHADOW E&M-EST. PATIENT-LVL IV: CPT | Mod: PBBFAC,,, | Performed by: OBSTETRICS & GYNECOLOGY

## 2024-07-16 PROCEDURE — 3008F BODY MASS INDEX DOCD: CPT | Mod: CPTII,S$GLB,, | Performed by: OBSTETRICS & GYNECOLOGY

## 2024-07-16 NOTE — PROGRESS NOTES
[facility] - UROGYNECOLOGY  [address] [address]    Rhea Archuleta  [id number]  1965 July 19, 2024    Consulting Physician: Tong Gaemz MD   GYN: MD Franco  Primary M.D.: No, Primary Doctor    Chief Complaint   Patient presents with    Follow-up    Vaginal Prolapse       HPI: from Dr. Manzano 5/23/2023    1)  UI:  (--) SENIA = (--) UUI  X 0 days.  (--) pads:0/day.  Daytime frequency: Q 5 hours.  Nocturia: Yes: 2/night.   (--) dysuria,  (--) hematuria,  (--) frequent UTIs.  (--) complete bladder emptying.     2)  POP:  Absent.  Symptoms:(--).  (--) vaginal bleeding. (--) vaginal discharge. (--) sexually active.  (--) dyspareunia.   (--)  Vaginal dryness.  (--) vaginal estrogen use.     3)  BM:  (--) constipation/straining.  (--) chronic diarrhea. (--) hematochezia.  (+) fecal incontinence - 1 episode one week ago.  (+) fecal smearing/urgency.  (--) complete evacuation.  +vaginal splinting.     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   No rim-enhancing, drainable soft tissue abscess.  --has h/o perianal abscesses--feels like pain in vagina, anus, back   --can't tell if drains intermittently   --intermittently gets larger, feels like hard ball  --would get larger, then decrease in size with ABX  --did have some yellow, mucoid discharge with passage of BM during last inflammation  --saw Vinnie Khan MD (CRS at [facility])    Discussion:  1) The treatment and pathophysiology of perirectal abscesses and fistula in ano were discussed with the patient.     We discussed incision and drainage (with potential seton placement if a fistula is identified).     We discussed reasons for seton placement, namely to help the abscess cavity drain and promote inflammation/healing. The seton can be removed at a later date and a definitive procedure can be  undertaken at that time.    We discussed primary fistulotomy if there is not a significant amount of tissue and muscle involved.    2) We discussed seton placement and secondary fistulotomy after maturation of the fistula tract. We discussed the risks of surgery including pain, bleeding, and recurrence. We also discussed the risk of incontinence. If there is a significant amount of anal sphincter involved we will not perform fistulotomy.    --OR 5-4-2023 (Bill):   1)  EUA  Procedure Details   The patient was brought into the operating room. General endotracheal anesthesia was induced and she tolerated this well. She was placed in the prone position and all pressure points were padded.    We performed a local block with 20 mL of half percent Marcaine after prepping and draping the perineal area out sterilely.    Digital rectal exam revealed no masses. There was no abscess no internal opening appreciated after inserting a Degroot retractor. In terms of palpation, there was some fullness right lateral and right anterior.I Used a finder needle but did not locate any abscess. I suspect this was scar tissue. Anterior/left anterior there was a small tag in the anal canal which may represent a previous rectovaginal fistula. There was no internal opening seen.    I inserted a speculum in the vagina and there was no fistula, opening, or mass seen.    We irrigated the anal canal. The patient tolerated the procedure well and was transferred in stable condition to the PACU.      12/29/2023:    1) EUA  2) incision and drainage of abscess    Surgeon: Vinnie Khan MD  Assistant: none  Anesthesia: general    Blood loss: < 5 mL  Findings: abscess as above    Description of procedure:    The patient was brought into the operating room. Anesthesia was administered. The patient was placed into the prone position. All pressure points were padded. The area was prepped and draped in a sterile fashion.    An exam which done which revealed  "firmness in the right anterior area. There was the previous scar site seen. A scalpel was used to incise the area of fluctuance. There was return of purulent fluid. The abscess pocket was very deep. Cultures were not taken.    I did irrigate the canal with hydrogen peroxide and there was no fistula to the rectum or vagina seen.    The abscess cavity and anal canal were thoroughly irrigated. We confirmed hemostasis. Instrument counts were correct. Patient tolerated the procedure well and was transferred in stable condition to the PACU.     2024:  Referred by Dr. Gamez. Patient has seen Dr. Manzano in the past last seen 2023.  Today she presents with complaints of "fistula returning"  She had recurring perirectal abscess which occurs every 6 months requiring draining     Past Medical History  Past Medical History:   Diagnosis Date    Abnormal Pap smear of cervix     Thyroid disease    --h/o NEELA 3: thinned PB s/p WLE (); then had multiple more WLEs and topical Tx    Past Surgical History  Past Surgical History:   Procedure Laterality Date    CERVICAL BIOPSY  W/ LOOP ELECTRODE EXCISION      TUBAL LIGATION     --WLE for NEELA 3 ; then had multiple more WLEs and topical Tx (Efren Rollins in [place])  --6/27/10 single incision BARD MUS anchored to transobsturator as well as lap BTL    Hysterectomy: No    Past Ob History     x 2.  C/s x 0.    Largest infant weight: 7 lbs 6 oz  yes FAVD. yes episiotomy.      Gynecologic History  LMP: No LMP recorded. Patient is postmenopausal.  Age of menarche: 19  Age of menopause: 52   Menstrual history: normal cycle, light bleeding   Pap test: 2023 normal, HPV NEG.  History of abnormal paps: unknown--had WLE mult for VIN3.   History of STIs:  Yes - chlamydial, treated.   Mammogram: Date of last: 2023.  Result: Normal  Colonoscopy: Date of last: .  Result:  normal.  Repeat due:  unknown.    DEXA:  Date of last: unknown.  Result:  unknown.  Repeat " due:  unknown.     Family History  Family History   Problem Relation Name Age of Onset    Hypertension Mother      Osteoporosis Mother        Colon CA: No  Breast CA: No  GYN CA: sister had stage 1 uterine cancer    CA: No    Social History  Social History     Tobacco Use   Smoking Status Former    Types: Cigarettes   Smokeless Tobacco Never   Tobacco Comments    Quit 2019   .  Cessation date: May 2019.  PPD:  1 x 10-15 years.   Social History     Substance and Sexual Activity   Alcohol Use Not Currently   .    Social History     Substance and Sexual Activity   Drug Use Never     The patient is single.  Resides in [place] [address].  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      Allergies  Review of patient's allergies indicates:   Allergen Reactions    Clarithromycin Hives and Other (See Comments)     unknown         Medications  Current Outpatient Medications on File Prior to Visit   Medication Sig Dispense Refill    buPROPion (WELLBUTRIN XL) 150 MG TB24 tablet Take 1 tablet by mouth once daily.      levothyroxine (SYNTHROID) 75 MCG tablet levothyroxine 75 mcg tablet      tamsulosin HCl (FLOMAX ORAL) Take by mouth.      estradioL (ESTRACE) 0.01 % (0.1 mg/gram) vaginal cream --Use 0.5 grams of estrogen cream in vagina with applicator or dime-sized amount with finger (as far as can reach internally) nightly x 2 weeks, then twice a week thereafter.  You can also apply a dime-sized amount with your finger around the vaginal opening and inner lips at same frequency (Patient not taking: Reported on 7/9/2024) 42.5 g 3    LORazepam (ATIVAN) 0.5 MG tablet lorazepam 0.5 mg tablet   Take 1 tablet twice a day by oral route as needed for 30 days. (Patient not taking: Reported on 7/9/2024)      tretinoin (RETIN-A) 0.025 % cream tretinoin 0.025 % topical cream   APPLY A PEA SIZED AMOUNT AT BEDTIME. BEGIN 2-3 TIMES A WEEK, THEN INCREASE AS TOLERATED (Patient not taking: Reported on 7/9/2024)       No  "current facility-administered medications on file prior to visit.       Review of Systems A 14 point ROS was reviewed with pertinent positives as noted above in the history of present illness.      Constitutional: negative  Eyes: negative  Endocrine: negative  Gastrointestinal: negative  Cardiovascular: negative  Respiratory: negative  Allergic/Immunologic: negative  Integumentary: negative  Psychiatric: negative  Musculoskeletal: negative   Ear/Nose/Throat: negative  Neurologic: negative  Genitourinary: SEE HPI  Hematologic/Lymphatic: negative   Breast: negative    Urogynecologic Exam  /75   Pulse 73   Ht 5' 7" (1.702 m)   Wt 87.6 kg (193 lb 2 oz)   BMI 30.25 kg/m²     GENERAL APPEARANCE:  The patient is well-developed, well-nourished.   Neck:  Supple with no thyromegaly, no carotid bruits.  Heart:  Regular rate and rhythm, no murmurs, rubs or gallops.  Lungs:  Clear.  No CVA tenderness.  Abdomen:  Soft, nontender, nondistended, no hepatosplenomegaly.  Incisions:  none    PELVIC:    External genitalia:  Normal Bartholins, Skenes and labia bilaterally.   Urethra:  No caruncle, diverticulum or masses.  (+) hypermobility.    Vagina:  Atrophy (+) , no bladder masses or tender, no discharge.   Cervix:  normal appearance  Uterus: normal size, contour, position, consistency, mobility, non-tender  Adnexa: Not palpable.    POP-Q:  Aa -2; Ba -2; C -6; Ap -2 Bp -2; D -7.  Genital hiatus 3, perineal body 2, total vaginal length 10.    NEUROLOGIC:  Cranial nerves 2 through 12 intact.  Strength 5/5.  DTRs 2+ lower extremities.  S2 through 4 normal.  Sacral reflexes intact.    EXT: LARIOS, 2+ pulses bilaterally, no C/C/E    COUGH STRESS TEST:  negative  KEGEL: 1 /5    RECTAL:    External:  Normal, (+) hemorrhoids--old external, non-thrombosed, (--) dovetailing.   Internal:   (--) tenderness, (--) masses, Normal resting tone, Normal active tone.   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 Vinnie Vaughn   --able to palpate a cystic structure deep to the right perianal tissue   --[facility] CRS did EUA 5-2023 without obvious tract/fistula noted  --wants a second opinion new Rx given for CRS.       Maciel Edward  Female Pelvic Medicine and Reconstructive Surgery  [facility] [place], [place]

## 2024-07-17 ENCOUNTER — TELEPHONE (OUTPATIENT)
Dept: UROGYNECOLOGY | Facility: CLINIC | Age: 59
End: 2024-07-17
Payer: COMMERCIAL

## 2024-07-17 ENCOUNTER — TELEPHONE (OUTPATIENT)
Dept: SURGERY | Facility: CLINIC | Age: 59
End: 2024-07-17
Payer: COMMERCIAL

## 2024-07-17 NOTE — TELEPHONE ENCOUNTER
Spoke with patient regarding need for appointment with Dr. Velazquez. Appointment rescheduled for sooner date. Details confirmed verbally over phone and viewable in portal.

## 2024-07-17 NOTE — TELEPHONE ENCOUNTER
----- Message from Christine Lima sent at 7/17/2024  8:05 AM CDT -----  Regarding: Experiencing Symptoms  Contact: Pt @[phone]  Pt is calling to speak to someone in the office; asking for a sooner appt than what they are scheduled for. Pt is already on the wait list; no available appts in Epic that are coming up soon. Pt is asking to speak to someone in the office. Please call to advise. Thanks.         Reason for sooner appt: pt is in pain         Pt's DX: Painful Abscess and feeling  uncomfortable          When is the first available appointment? 8-5-2024         Communication Preference:[phone]

## 2024-07-17 NOTE — TELEPHONE ENCOUNTER
Called pt to return phone call and confirm they wanted an appt with Dr. Velazquez and not Dr. Edward. Pt said they are seeing Dr. Velazquez tomorrow and don't need another appt. Call ended

## 2024-07-18 ENCOUNTER — OFFICE VISIT (OUTPATIENT)
Dept: SURGERY | Facility: CLINIC | Age: 59
End: 2024-07-18
Payer: COMMERCIAL

## 2024-07-18 VITALS
DIASTOLIC BLOOD PRESSURE: 67 MMHG | OXYGEN SATURATION: 97 % | HEART RATE: 72 BPM | SYSTOLIC BLOOD PRESSURE: 140 MMHG | HEIGHT: 67 IN | BODY MASS INDEX: 30.61 KG/M2 | RESPIRATION RATE: 19 BRPM | WEIGHT: 195 LBS

## 2024-07-18 DIAGNOSIS — K60.3 FISTULA-IN-ANO: ICD-10-CM

## 2024-07-18 PROCEDURE — 3078F DIAST BP <80 MM HG: CPT | Mod: CPTII,S$GLB,, | Performed by: SURGERY

## 2024-07-18 PROCEDURE — 99999 PR PBB SHADOW E&M-EST. PATIENT-LVL V: CPT | Mod: PBBFAC,,, | Performed by: SURGERY

## 2024-07-18 PROCEDURE — 1159F MED LIST DOCD IN RCRD: CPT | Mod: CPTII,S$GLB,, | Performed by: SURGERY

## 2024-07-18 PROCEDURE — 3008F BODY MASS INDEX DOCD: CPT | Mod: CPTII,S$GLB,, | Performed by: SURGERY

## 2024-07-18 PROCEDURE — 99204 OFFICE O/P NEW MOD 45 MIN: CPT | Mod: S$GLB,,, | Performed by: SURGERY

## 2024-07-18 PROCEDURE — 3077F SYST BP >= 140 MM HG: CPT | Mod: CPTII,S$GLB,, | Performed by: SURGERY

## 2024-07-18 NOTE — PROGRESS NOTES
"CRS Office Visit History and Physical    Referring Md:   Maciel Edward Do  [address] [facility] [facility]  [address]  [place] [address]    SUBJECTIVE:     Chief Complaint: perianal abscess    History of Present Illness:  The patient is a new patient to this practice.   Course is as follows:  Rhea Archuleta is a 59 y.o. female presents with recurrent perianal abscess.  Has had surgery 5/23 and 12/23 with Dr. Khan in [place].  Had large right perianal abscess drained 12/23.  No fistula identified.  Reports recurrence of symptoms.  Feels discomfort that is positional.  No drainage.  Has had a colonoscopy, thinks about 7 years ago and was normal.      Last Colonoscopy: 7 years ago     Review of patient's allergies indicates:   Allergen Reactions    Clarithromycin Hives and Other (See Comments)     unknown         Past Medical History:   Diagnosis Date    Abnormal Pap smear of cervix     Thyroid disease      Past Surgical History:   Procedure Laterality Date    CERVICAL BIOPSY  W/ LOOP ELECTRODE EXCISION      TUBAL LIGATION       Family History   Problem Relation Name Age of Onset    Hypertension Mother      Osteoporosis Mother       Social History     Tobacco Use    Smoking status: Former     Types: Cigarettes    Smokeless tobacco: Never    Tobacco comments:     Quit 2019   Substance Use Topics    Alcohol use: Not Currently    Drug use: Never        Review of Systems:  Review of Systems   All other systems reviewed and are negative.    OBJECTIVE:     Vital Signs (Most Recent)  BP (!) 140/67 (BP Location: Left arm, Patient Position: Sitting)   Pulse 72   Resp 19   Ht 5' 7.01" (1.702 m)   Wt 88.5 kg (195 lb)   SpO2 97%   BMI 30.53 kg/m²     Physical Exam:  General: 59 y.o. female in no distress   Neuro: alert and oriented x 4.  Moves all extremities.     HEENT: normocephalic, atraumatic, PERRL, EOMI   Respiratory: respirations are even and unlabored  Cardiac: regular rate and rhythm  Abdomen: soft, NTND "   Extremities: Warm dry and intact  Skin: no rashes  Anorectal: right anterolateral fullness with tenderness to palpation, no evidence of drainage per vagina, UNIQUE without masses or blood     Labs: NA    Imaging:   FINDINGS:  There is a right-sided perianal enhancing fluid collection/phlegmon   extending inferiorly into the medial perineum subcutaneous fat, 5 x 2 x 1.7 cm,   extending towards although without definite connection with the skin surface.   This imaging pattern most characteristic of a perianal fistula with surrounding   inflammatory sequela.  There is no rim-enhancing, drainable soft tissue   abscess.   Normal size uterus and ovaries.  No adnexal mass.  Normal vagina.   Normal bladder.  Normal urethra.   The pelvic bowel loops are otherwise unremarkable.   Bones are unremarkable.   Dictated and Electronically Signed By: Tony Lees MD on November 1, 2022       ASSESSMENT/PLAN:     Diagnoses and all orders for this visit:    Fistula-in-ano  -     Ambulatory referral/consult to Colorectal Surgery  -     Case Request Operating Room: Exam under anesthesia, possible fistulotomy vs. seton placement, flexible sigmoidoscopy        59 y.o. female with perianal abscess    - outside operative reports and prior imaging reviewed.  History is concerning for fistula in ano given that this is her third recurrence in the same location  - We reviewed the cryptoglandular etiology of anorectal abscess and fistula-in-ano using visual diagrams. We reviewed that ~30% of patients who have had an abscess will develop a fistula.     We reviewed treatment options:  (1) Clinical observation- this would avoid the possible need for multiple procedures, but will a low likelihood that the fistula wound resolve.  (2) Exam under anesthesia- at this time, we reviewed that if <30% of the sphincter were involved I would perform a fistulotomy, which is associated with >90% healing.  If >30% of the sphincter were involved , I would place a  seton.  This would remain in place for at least 3 months, and would then require a second procedure for repair (advancement flap or ligation of intersphincteric fistula [LIFT]).  We reviewed that success rates for these procedures are ~60-70%.     We reviewed expectations following the procedure, including pain, bleeding, possible changes in continence to gas or liquid stool.  Asked if any additional questions, none at this time.    - to OR tomorrow for JUDIT Velazquez MD  Staff Surgeon  Colon & Rectal Surgery

## 2024-07-19 ENCOUNTER — ANESTHESIA (OUTPATIENT)
Dept: SURGERY | Facility: OTHER | Age: 59
End: 2024-07-19
Payer: COMMERCIAL

## 2024-07-19 ENCOUNTER — PATIENT MESSAGE (OUTPATIENT)
Dept: SURGERY | Facility: CLINIC | Age: 59
End: 2024-07-19

## 2024-07-19 ENCOUNTER — ANESTHESIA EVENT (OUTPATIENT)
Dept: SURGERY | Facility: OTHER | Age: 59
End: 2024-07-19
Payer: COMMERCIAL

## 2024-07-19 ENCOUNTER — TELEPHONE (OUTPATIENT)
Dept: SURGERY | Facility: CLINIC | Age: 59
End: 2024-07-19
Payer: COMMERCIAL

## 2024-07-19 ENCOUNTER — HOSPITAL ENCOUNTER (OUTPATIENT)
Facility: OTHER | Age: 59
Discharge: HOME | End: 2024-07-19
Attending: SURGERY | Admitting: SURGERY
Payer: COMMERCIAL

## 2024-07-19 VITALS
RESPIRATION RATE: 16 BRPM | DIASTOLIC BLOOD PRESSURE: 70 MMHG | HEART RATE: 70 BPM | OXYGEN SATURATION: 97 % | TEMPERATURE: 98 F | SYSTOLIC BLOOD PRESSURE: 145 MMHG

## 2024-07-19 DIAGNOSIS — K60.3 ANAL FISTULA: ICD-10-CM

## 2024-07-19 PROBLEM — K60.30 FISTULA-IN-ANO: Status: ACTIVE | Noted: 2024-07-19

## 2024-07-19 PROCEDURE — 36000705 HC OR TIME LEV I EA ADD 15 MIN: Performed by: SURGERY

## 2024-07-19 PROCEDURE — 25000003 PHARM REV CODE 250: Performed by: ANESTHESIOLOGY

## 2024-07-19 PROCEDURE — 46922 EXCISION OF ANAL LESION(S): CPT | Mod: ,,, | Performed by: SURGERY

## 2024-07-19 PROCEDURE — 36000704 HC OR TIME LEV I 1ST 15 MIN: Performed by: SURGERY

## 2024-07-19 PROCEDURE — 63600175 PHARM REV CODE 636 W HCPCS: Mod: JZ,JG | Performed by: SURGERY

## 2024-07-19 PROCEDURE — 25000003 PHARM REV CODE 250: Performed by: SURGERY

## 2024-07-19 PROCEDURE — 37000008 HC ANESTHESIA 1ST 15 MINUTES: Performed by: SURGERY

## 2024-07-19 PROCEDURE — 71000033 HC RECOVERY, INTIAL HOUR: Performed by: SURGERY

## 2024-07-19 PROCEDURE — 25000003 PHARM REV CODE 250: Performed by: STUDENT IN AN ORGANIZED HEALTH CARE EDUCATION/TRAINING PROGRAM

## 2024-07-19 PROCEDURE — 88304 TISSUE EXAM BY PATHOLOGIST: CPT | Mod: 26,,, | Performed by: STUDENT IN AN ORGANIZED HEALTH CARE EDUCATION/TRAINING PROGRAM

## 2024-07-19 PROCEDURE — 63600175 PHARM REV CODE 636 W HCPCS: Performed by: STUDENT IN AN ORGANIZED HEALTH CARE EDUCATION/TRAINING PROGRAM

## 2024-07-19 PROCEDURE — 71000015 HC POSTOP RECOV 1ST HR: Performed by: SURGERY

## 2024-07-19 PROCEDURE — C1729 CATH, DRAINAGE: HCPCS | Performed by: SURGERY

## 2024-07-19 PROCEDURE — 63600175 PHARM REV CODE 636 W HCPCS: Performed by: ANESTHESIOLOGY

## 2024-07-19 PROCEDURE — 88304 TISSUE EXAM BY PATHOLOGIST: CPT | Performed by: STUDENT IN AN ORGANIZED HEALTH CARE EDUCATION/TRAINING PROGRAM

## 2024-07-19 PROCEDURE — 37000009 HC ANESTHESIA EA ADD 15 MINS: Performed by: SURGERY

## 2024-07-19 PROCEDURE — 71000016 HC POSTOP RECOV ADDL HR: Performed by: SURGERY

## 2024-07-19 RX ORDER — BUPIVACAINE HYDROCHLORIDE 2.5 MG/ML
INJECTION, SOLUTION EPIDURAL; INFILTRATION; INTRACAUDAL
Status: DISCONTINUED | OUTPATIENT
Start: 2024-07-19 | End: 2024-07-19 | Stop reason: HOSPADM

## 2024-07-19 RX ORDER — MUPIROCIN 20 MG/G
OINTMENT TOPICAL
Status: DISCONTINUED | OUTPATIENT
Start: 2024-07-19 | End: 2024-07-19 | Stop reason: HOSPADM

## 2024-07-19 RX ORDER — LIDOCAINE HYDROCHLORIDE 10 MG/ML
INJECTION, SOLUTION INFILTRATION; PERINEURAL
Status: DISCONTINUED | OUTPATIENT
Start: 2024-07-19 | End: 2024-07-19 | Stop reason: HOSPADM

## 2024-07-19 RX ORDER — OXYCODONE HYDROCHLORIDE 10 MG/1
5 TABLET ORAL EVERY 4 HOURS PRN
Qty: 10 TABLET | Refills: 0 | Status: SHIPPED | OUTPATIENT
Start: 2024-07-19

## 2024-07-19 RX ORDER — HYDROMORPHONE HYDROCHLORIDE 2 MG/ML
0.4 INJECTION, SOLUTION INTRAMUSCULAR; INTRAVENOUS; SUBCUTANEOUS EVERY 5 MIN PRN
Status: DISCONTINUED | OUTPATIENT
Start: 2024-07-19 | End: 2024-07-19 | Stop reason: HOSPADM

## 2024-07-19 RX ORDER — SODIUM CHLORIDE 0.9 % (FLUSH) 0.9 %
3 SYRINGE (ML) INJECTION
Status: DISCONTINUED | OUTPATIENT
Start: 2024-07-19 | End: 2024-07-19 | Stop reason: HOSPADM

## 2024-07-19 RX ORDER — SODIUM CHLORIDE 9 MG/ML
INJECTION, SOLUTION INTRAVENOUS CONTINUOUS
Status: DISCONTINUED | OUTPATIENT
Start: 2024-07-19 | End: 2024-07-19 | Stop reason: HOSPADM

## 2024-07-19 RX ORDER — GLUCAGON 1 MG
1 KIT INJECTION
Status: DISCONTINUED | OUTPATIENT
Start: 2024-07-19 | End: 2024-07-19 | Stop reason: HOSPADM

## 2024-07-19 RX ORDER — LIDOCAINE HYDROCHLORIDE 20 MG/ML
INJECTION INTRAVENOUS
Status: DISCONTINUED | OUTPATIENT
Start: 2024-07-19 | End: 2024-07-19

## 2024-07-19 RX ORDER — KETOROLAC TROMETHAMINE 30 MG/ML
INJECTION, SOLUTION INTRAMUSCULAR; INTRAVENOUS
Status: DISCONTINUED | OUTPATIENT
Start: 2024-07-19 | End: 2024-07-19

## 2024-07-19 RX ORDER — FENTANYL CITRATE 50 UG/ML
INJECTION, SOLUTION INTRAMUSCULAR; INTRAVENOUS
Status: DISCONTINUED | OUTPATIENT
Start: 2024-07-19 | End: 2024-07-19

## 2024-07-19 RX ORDER — LIDOCAINE HYDROCHLORIDE 10 MG/ML
1 INJECTION, SOLUTION EPIDURAL; INFILTRATION; INTRACAUDAL; PERINEURAL ONCE
Status: DISCONTINUED | OUTPATIENT
Start: 2024-07-19 | End: 2024-07-19 | Stop reason: HOSPADM

## 2024-07-19 RX ORDER — DEXAMETHASONE SODIUM PHOSPHATE 4 MG/ML
INJECTION, SOLUTION INTRA-ARTICULAR; INTRALESIONAL; INTRAMUSCULAR; INTRAVENOUS; SOFT TISSUE
Status: DISCONTINUED | OUTPATIENT
Start: 2024-07-19 | End: 2024-07-19

## 2024-07-19 RX ORDER — OXYCODONE HYDROCHLORIDE 5 MG/1
5 TABLET ORAL
Status: DISCONTINUED | OUTPATIENT
Start: 2024-07-19 | End: 2024-07-19 | Stop reason: HOSPADM

## 2024-07-19 RX ORDER — MEPERIDINE HYDROCHLORIDE 25 MG/ML
12.5 INJECTION INTRAMUSCULAR; INTRAVENOUS; SUBCUTANEOUS ONCE AS NEEDED
Status: DISCONTINUED | OUTPATIENT
Start: 2024-07-19 | End: 2024-07-19 | Stop reason: HOSPADM

## 2024-07-19 RX ORDER — PROPOFOL 10 MG/ML
VIAL (ML) INTRAVENOUS
Status: DISCONTINUED | OUTPATIENT
Start: 2024-07-19 | End: 2024-07-19

## 2024-07-19 RX ORDER — ONDANSETRON HYDROCHLORIDE 2 MG/ML
INJECTION, SOLUTION INTRAVENOUS
Status: DISCONTINUED | OUTPATIENT
Start: 2024-07-19 | End: 2024-07-19

## 2024-07-19 RX ORDER — PROCHLORPERAZINE EDISYLATE 5 MG/ML
5 INJECTION INTRAMUSCULAR; INTRAVENOUS EVERY 30 MIN PRN
Status: DISCONTINUED | OUTPATIENT
Start: 2024-07-19 | End: 2024-07-19 | Stop reason: HOSPADM

## 2024-07-19 RX ADMIN — HYDROMORPHONE HYDROCHLORIDE 0.4 MG: 2 INJECTION INTRAMUSCULAR; INTRAVENOUS; SUBCUTANEOUS at 03:07

## 2024-07-19 RX ADMIN — DEXAMETHASONE SODIUM PHOSPHATE 4 MG: 4 INJECTION, SOLUTION INTRAMUSCULAR; INTRAVENOUS at 02:07

## 2024-07-19 RX ADMIN — ONDANSETRON HYDROCHLORIDE 4 MG: 2 INJECTION INTRAMUSCULAR; INTRAVENOUS at 02:07

## 2024-07-19 RX ADMIN — FENTANYL CITRATE 50 MCG: 50 INJECTION, SOLUTION INTRAMUSCULAR; INTRAVENOUS at 02:07

## 2024-07-19 RX ADMIN — KETOROLAC TROMETHAMINE 15 MG: 30 INJECTION, SOLUTION INTRAMUSCULAR; INTRAVENOUS at 02:07

## 2024-07-19 RX ADMIN — GLYCOPYRROLATE 0.2 MG: 0.2 INJECTION, SOLUTION INTRAMUSCULAR; INTRAVITREAL at 02:07

## 2024-07-19 RX ADMIN — LIDOCAINE HYDROCHLORIDE 80 MG: 20 INJECTION, SOLUTION INTRAVENOUS at 02:07

## 2024-07-19 RX ADMIN — SODIUM CHLORIDE, SODIUM LACTATE, POTASSIUM CHLORIDE, AND CALCIUM CHLORIDE: .6; .31; .03; .02 INJECTION, SOLUTION INTRAVENOUS at 01:07

## 2024-07-19 RX ADMIN — PROCHLORPERAZINE EDISYLATE 5 MG: 5 INJECTION INTRAMUSCULAR; INTRAVENOUS at 04:07

## 2024-07-19 RX ADMIN — PROPOFOL 190 MG: 10 INJECTION, EMULSION INTRAVENOUS at 02:07

## 2024-07-19 RX ADMIN — OXYCODONE 5 MG: 5 TABLET ORAL at 03:07

## 2024-07-19 NOTE — ANESTHESIA PREPROCEDURE EVALUATION
07/19/2024  Rhea Acrhuleta is a 59 y.o., female.      Pre-op Assessment    I have reviewed the Patient Summary Reports.     I have reviewed the Nursing Notes. I have reviewed the NPO Status.   I have reviewed the Medications.     Review of Systems  Anesthesia Hx:  No previous Anesthesia                Social:  Non-Smoker       Hematology/Oncology:  Hematology Normal   Oncology Normal                                   EENT/Dental:  EENT/Dental Normal           Cardiovascular:  Exercise tolerance: good                                           Pulmonary:  Pulmonary Normal                       Renal/:  Renal/ Normal                 Hepatic/GI:  Hepatic/GI Normal                 Endocrine:  Endocrine Normal            Psych:  Psychiatric Normal                    Physical Exam  General: Well nourished, Cooperative and Alert    Airway:  Mallampati: II   Mouth Opening: Normal  TM Distance: Normal  Tongue: Normal  Neck ROM: Normal ROM    Dental:  Intact        Anesthesia Plan  Type of Anesthesia, risks & benefits discussed:    Anesthesia Type: Gen Supraglottic Airway, MAC  Intra-op Monitoring Plan: Standard ASA Monitors  Post Op Pain Control Plan: multimodal analgesia  Induction:  IV  Informed Consent: Informed consent signed with the Patient and all parties understand the risks and agree with anesthesia plan.  All questions answered.   ASA Score: 2    Ready For Surgery From Anesthesia Perspective.     .

## 2024-07-19 NOTE — ANESTHESIA PROCEDURE NOTES
Intubation    Date/Time: 7/19/2024 2:04 PM    Performed by: Radha Guadarrama CRNA  Authorized by: Nam Santiago MD    Intubation:     Induction:  Intravenous    Intubated:  Postinduction    Mask Ventilation:  N/a    Attempts:  1    Attempted By:  CRNA    Difficult Airway Encountered?: No      Complications:  None    Airway Device:  Supraglottic airway/LMA    Airway Device Size:  4.0    Style/Cuff Inflation:  Cuffed (inflated to minimal occlusive pressure)    Secured at:  The lips    Placement Verified By:  Capnometry    Complicating Factors:  None    Findings Post-Intubation:  BS equal bilateral and atraumatic/condition of teeth unchanged

## 2024-07-19 NOTE — TELEPHONE ENCOUNTER
Spoke with pt regarding surgery today. Pt concerned that procedure will happen to due nationwide outage of EPIC systems. Informed pt that OR is currently running and procedures are scheduled to proceed as planned. Pt is currently in [facility] in route to hospital. No further questions at this time.       ----- Message from Kristina Grove sent at 7/19/2024  8:04 AM CDT -----  Regarding: Urgent Callback  Contact: [phone]  Patient calling requesting a callback from nurse or provider in regards to making sure surgery is still happening because all over the news is an world tech outage. Please call back as soon as possible.

## 2024-07-19 NOTE — ANESTHESIA POSTPROCEDURE EVALUATION
Anesthesia Post Evaluation    Patient: Rhea Archuleta    Procedure(s) Performed: Procedure(s) (LRB):  Exam under anesthesia, I&D OF PERIRECTAL ABSCESS, FLEXIBLE SIGMOIDOSCOPY (N/A)  SIGMOIDOSCOPY, FLEXIBLE (N/A)    Final Anesthesia Type: general      Patient location during evaluation: PACU  Patient participation: Yes- Able to Participate  Level of consciousness: awake and alert  Post-procedure vital signs: reviewed and stable  Pain management: adequate  Airway patency: patent  MERVAT mitigation strategies: Extubation while patient is awake  PONV status at discharge: No PONV  Anesthetic complications: no      Cardiovascular status: hemodynamically stable  Respiratory status: unassisted  Hydration status: euvolemic  Follow-up not needed.              Vitals Value Taken Time   /70 07/19/24 1545   Temp 36.7 °C (98 °F) 07/19/24 1507   Pulse 93 07/19/24 1545   Resp 16 07/19/24 1545   SpO2 96 % 07/19/24 1545         Event Time   Out of Recovery 15:56:57         Pain/Ronel Score: Pain Rating Prior to Med Admin: 7 (7/19/2024  3:38 PM)  Ronel Score: 10 (7/19/2024  3:37 PM)

## 2024-07-19 NOTE — TRANSFER OF CARE
Anesthesia Transfer of Care Note    Patient: Rhea Archuleta    Procedure(s) Performed: Procedure(s) (LRB):  Exam under anesthesia, I&D OF PERIRECTAL ABSCESS, FLEXIBLE SIGMOIDOSCOPY (N/A)  SIGMOIDOSCOPY, FLEXIBLE (N/A)    Patient location: PACU    Anesthesia Type: general    Transport from OR: Transported from OR on 2-3 L/min O2 by NC with adequate spontaneous ventilation    Post pain: adequate analgesia    Post assessment: no apparent anesthetic complications and tolerated procedure well    Post vital signs: stable    Level of consciousness: awake and alert    Nausea/Vomiting: no nausea/vomiting    Complications: none    Transfer of care protocol was followed      Last vitals: Visit Vitals  /66 (BP Location: Left arm, Patient Position: Lying)   Pulse 62   Temp 36.4 °C (97.6 °F) (Oral)   Resp 18   SpO2 97%   Breastfeeding No

## 2024-07-22 ENCOUNTER — TELEPHONE (OUTPATIENT)
Dept: SURGERY | Facility: CLINIC | Age: 59
End: 2024-07-22
Payer: COMMERCIAL

## 2024-07-22 NOTE — TELEPHONE ENCOUNTER
Spoke with pt regarding PO appt with Dr. Velazquez for a drain removal. Pt requesting to r/s to 7/26 as she will have assistance driving into town. Pt confirms that drainage is tapering down and believes that it will stop by Friday. Informed that we can reschedule to 7/26 for 0940. Directions provided to clinic location. Pt denies further questions at this time.       ----- Message from Shruthi Hernandez sent at 7/22/2024  9:40 AM CDT -----  Who Called: Pt    What is the request in detail: Requesting call back to discuss rescheduling 07/29 appt to Friday 07/26, pt will have help that day. Please advise    Can the clinic reply by [facility]? No    Best Call Back Number: [phone]      Additional Information:

## 2024-07-24 ENCOUNTER — TELEPHONE (OUTPATIENT)
Dept: SURGERY | Facility: CLINIC | Age: 59
End: 2024-07-24
Payer: COMMERCIAL

## 2024-07-26 ENCOUNTER — OFFICE VISIT (OUTPATIENT)
Dept: SURGERY | Facility: CLINIC | Age: 59
End: 2024-07-26
Payer: COMMERCIAL

## 2024-07-26 VITALS
HEART RATE: 80 BPM | RESPIRATION RATE: 19 BRPM | HEIGHT: 67 IN | SYSTOLIC BLOOD PRESSURE: 143 MMHG | BODY MASS INDEX: 31.11 KG/M2 | DIASTOLIC BLOOD PRESSURE: 64 MMHG | OXYGEN SATURATION: 95 % | WEIGHT: 198.19 LBS

## 2024-07-26 DIAGNOSIS — K61.0 PERIANAL ABSCESS: Primary | ICD-10-CM

## 2024-07-26 PROCEDURE — 99999 PR PBB SHADOW E&M-EST. PATIENT-LVL III: CPT | Mod: PBBFAC,,, | Performed by: SURGERY

## 2024-07-26 NOTE — PROGRESS NOTES
Colon & Rectal Surgery Clinic Follow Up    HPI:   Rhea Archuleta is a 59 y.o. female presents with recurrent perianal abscess.  Has had surgery 5/23 and 12/23 with Dr. Khan in [place].  Had large right perianal abscess drained 12/23.  No fistula identified.  Reports recurrence of symptoms.  Feels discomfort that is positional.  No drainage.  Has had a colonoscopy, thinks about 7 years ago and was normal.      Interval history:   7/19/24 EUA with debridement of abscess cavity, flexible sigmoidoscopy     Drain is bothersome.  Having yellow drainage from wound.  Otherwise doing well.     Objective:   Vitals:    07/26/24 0942   BP: (!) 143/64   Pulse: 80   Resp: 19        Physical Exam   Gen: well developed female, NAD  HEENT: normocephalic, atraumatic, PERRL, EOMI   CV: RRR, no murmurs  Resp: nonlabored, CTAB   Abd: soft, NTND   MSK: no gross deformities, no cyanosis or edema   Anorectal: right lateral malecot drain in place     Assessment and Plan:   Rhea Archuleta  is a 59 y.o. female who presents with recurrent perirectal abscess and concern for fistula in ano     - no fistula seen on exam  - malecot removed in clinic today  - local wound care, apply calmoseptine twice daily to perianal skin   - follow up in 1 month      Mayuri Velazquez MD  Staff Surgeon   Colon & Rectal Surgery

## 2024-08-01 LAB
FINAL PATHOLOGIC DIAGNOSIS: NORMAL
GROSS: NORMAL
Lab: NORMAL

## 2024-08-09 ENCOUNTER — HOSPITAL ENCOUNTER (OUTPATIENT)
Dept: RADIOLOGY | Facility: HOSPITAL | Age: 59
Discharge: HOME | End: 2024-08-09
Attending: OBSTETRICS & GYNECOLOGY
Payer: COMMERCIAL

## 2024-08-09 ENCOUNTER — OFFICE VISIT (OUTPATIENT)
Dept: OBSTETRICS AND GYNECOLOGY | Facility: CLINIC | Age: 59
End: 2024-08-09
Attending: OBSTETRICS & GYNECOLOGY
Payer: COMMERCIAL

## 2024-08-09 VITALS
WEIGHT: 195.75 LBS | BODY MASS INDEX: 30.72 KG/M2 | HEIGHT: 67 IN | SYSTOLIC BLOOD PRESSURE: 130 MMHG | DIASTOLIC BLOOD PRESSURE: 70 MMHG

## 2024-08-09 VITALS — BODY MASS INDEX: 30.79 KG/M2 | HEIGHT: 67 IN | WEIGHT: 196.19 LBS

## 2024-08-09 DIAGNOSIS — Z01.419 WELL WOMAN EXAM WITH ROUTINE GYNECOLOGICAL EXAM: Primary | ICD-10-CM

## 2024-08-09 DIAGNOSIS — N81.11 CYSTOCELE, MIDLINE: ICD-10-CM

## 2024-08-09 DIAGNOSIS — N81.6 RECTOCELE: ICD-10-CM

## 2024-08-09 DIAGNOSIS — Z12.31 ENCOUNTER FOR SCREENING MAMMOGRAM FOR BREAST CANCER: ICD-10-CM

## 2024-08-09 PROCEDURE — 77067 SCR MAMMO BI INCL CAD: CPT | Mod: 26,,, | Performed by: RADIOLOGY

## 2024-08-09 PROCEDURE — 77063 BREAST TOMOSYNTHESIS BI: CPT | Mod: 26,,, | Performed by: RADIOLOGY

## 2024-08-09 PROCEDURE — 77067 SCR MAMMO BI INCL CAD: CPT | Mod: TC

## 2024-08-09 PROCEDURE — 99999 PR PBB SHADOW E&M-EST. PATIENT-LVL III: CPT | Mod: PBBFAC,,, | Performed by: OBSTETRICS & GYNECOLOGY

## 2024-09-27 ENCOUNTER — TELEPHONE (OUTPATIENT)
Dept: SURGERY | Facility: CLINIC | Age: 59
End: 2024-09-27
Payer: COMMERCIAL

## 2024-09-27 NOTE — TELEPHONE ENCOUNTER
Attempted to contact pt regarding request to reschedule appt on 10/4 to 10/14. No answer. Left voicemail requesting call back. Portal message sent.           ----- Message from Jessy Alcala sent at 9/27/2024 10:02 AM CDT -----  Regarding: Appt r/s  Contact: [phone]  Rhea Archuleta calling regarding Appointment Access  (message) for # pt is calling to r/s her appt on Oct 14 morning or afternoon pls advise no appt avail in Epic pt declined other appts

## 2024-10-29 ENCOUNTER — PATIENT MESSAGE (OUTPATIENT)
Dept: SURGERY | Facility: CLINIC | Age: 59
End: 2024-10-29
Payer: COMMERCIAL

## 2024-11-01 ENCOUNTER — TELEPHONE (OUTPATIENT)
Dept: SURGERY | Facility: CLINIC | Age: 59
End: 2024-11-01
Payer: COMMERCIAL

## 2024-11-11 ENCOUNTER — OFFICE VISIT (OUTPATIENT)
Dept: SURGERY | Facility: CLINIC | Age: 59
End: 2024-11-11
Payer: COMMERCIAL

## 2024-11-11 VITALS
DIASTOLIC BLOOD PRESSURE: 69 MMHG | HEIGHT: 67 IN | RESPIRATION RATE: 16 BRPM | SYSTOLIC BLOOD PRESSURE: 145 MMHG | WEIGHT: 198.44 LBS | BODY MASS INDEX: 31.15 KG/M2 | HEART RATE: 63 BPM

## 2024-11-11 DIAGNOSIS — K60.30 FISTULA-IN-ANO: Primary | ICD-10-CM

## 2024-11-11 PROCEDURE — 3008F BODY MASS INDEX DOCD: CPT | Mod: CPTII,S$GLB,, | Performed by: SURGERY

## 2024-11-11 PROCEDURE — 99214 OFFICE O/P EST MOD 30 MIN: CPT | Mod: S$GLB,,, | Performed by: SURGERY

## 2024-11-11 PROCEDURE — 1159F MED LIST DOCD IN RCRD: CPT | Mod: CPTII,S$GLB,, | Performed by: SURGERY

## 2024-11-11 PROCEDURE — 99999 PR PBB SHADOW E&M-EST. PATIENT-LVL III: CPT | Mod: PBBFAC,,, | Performed by: SURGERY

## 2024-11-11 PROCEDURE — 3078F DIAST BP <80 MM HG: CPT | Mod: CPTII,S$GLB,, | Performed by: SURGERY

## 2024-11-11 PROCEDURE — 3077F SYST BP >= 140 MM HG: CPT | Mod: CPTII,S$GLB,, | Performed by: SURGERY

## 2024-11-11 RX ORDER — AMOXICILLIN AND CLAVULANATE POTASSIUM 875; 125 MG/1; MG/1
1 TABLET, FILM COATED ORAL EVERY 12 HOURS
Qty: 14 TABLET | Refills: 0 | Status: SHIPPED | OUTPATIENT
Start: 2024-11-11

## 2024-11-12 NOTE — PROGRESS NOTES
Colon & Rectal Surgery Clinic Follow Up    HPI:   Rhea Archuleta is a 59 y.o. female presents with recurrent perianal abscess.  Has had surgery 5/23 and 12/23 with Dr. Khan in [place].  Had large right perianal abscess drained 12/23.  No fistula identified.  Reports recurrence of symptoms.  Feels discomfort that is positional.  No drainage.  Has had a colonoscopy, thinks about 7 years ago and was normal.       7/19/24 EUA with debridement of abscess cavity, flexible sigmoidoscopy     Interval history:   Reports recurrent fluctuance at previous incision/drain site as well as secondary lesion near the perineum/thigh.  Painful to sit.  No drainage.  Recently had dental procedure and was taking amoxicillin without improvement.     Objective:   Vitals:    11/11/24 1528   BP: (!) 145/69   Pulse: 63   Resp: 16        Physical Exam   Gen: well developed female, NAD  HEENT: normocephalic, atraumatic, PERRL, EOMI   CV: RRR, no murmurs  Resp: nonlabored, CTAB   Abd: soft, NTND   MSK: no gross deformities, no cyanosis or edema   Anorectal: right lateral scar with mild fluctuance, no erythema or drainage, deep medial/posterior thigh/perineal fullness/induration without overlying erythema    Assessment and Plan:   Rhea Archuleta  is a 59 y.o. female who presents for follow up of recurrent perianal abscess    - Previous surgical incision healed, now with concern for recurrent abscess.    - Will start course of augmentin   - MR anal sphincter protocol   - follow up after studies completed.       Mayuri Velazquez MD  Staff Surgeon   Colon & Rectal Surgery

## 2024-11-18 ENCOUNTER — PATIENT MESSAGE (OUTPATIENT)
Dept: SURGERY | Facility: CLINIC | Age: 59
End: 2024-11-18
Payer: COMMERCIAL

## 2024-11-19 ENCOUNTER — TELEPHONE (OUTPATIENT)
Dept: SURGERY | Facility: CLINIC | Age: 59
End: 2024-11-19
Payer: COMMERCIAL

## 2024-11-19 NOTE — TELEPHONE ENCOUNTER
Spoke with pt regarding appt tomorrow. Pt denies having pain and does not want to occupy a clinic slot. Pt requests to wait until after MRI on 11/29. Advised that she is available to be seen tomorrow, but may not have availability going into Thanksgiving week. Pt confirms she is willing to wait and would like to be seen following MRI to schedule surgery if needed. Offered to be seen on 12/2 for 1 PM in the [facility]. Pt verbally confirmed time and location. Denies further questions at this time.

## 2024-11-29 ENCOUNTER — HOSPITAL ENCOUNTER (OUTPATIENT)
Dept: RADIOLOGY | Facility: HOSPITAL | Age: 59
Discharge: HOME | End: 2024-11-29
Attending: SURGERY
Payer: COMMERCIAL

## 2024-11-29 DIAGNOSIS — K60.30 FISTULA-IN-ANO: ICD-10-CM

## 2024-11-29 PROCEDURE — 25500020 PHARM REV CODE 255: Performed by: SURGERY

## 2024-11-29 PROCEDURE — A9585 GADOBUTROL INJECTION: HCPCS | Performed by: SURGERY

## 2024-11-29 PROCEDURE — 72197 MRI PELVIS W/O & W/DYE: CPT | Mod: TC

## 2024-11-29 PROCEDURE — 72197 MRI PELVIS W/O & W/DYE: CPT | Mod: 26,,, | Performed by: RADIOLOGY

## 2024-11-29 RX ORDER — GADOBUTROL 604.72 MG/ML
10 INJECTION INTRAVENOUS
Status: COMPLETED | OUTPATIENT
Start: 2024-11-29 | End: 2024-11-29

## 2024-11-29 RX ADMIN — GADOBUTROL 10 ML: 604.72 INJECTION INTRAVENOUS at 05:11

## 2024-12-01 ENCOUNTER — PATIENT MESSAGE (OUTPATIENT)
Dept: SURGERY | Facility: CLINIC | Age: 59
End: 2024-12-01
Payer: COMMERCIAL

## 2024-12-02 DIAGNOSIS — K60.30 FISTULA-IN-ANO: Primary | ICD-10-CM

## 2024-12-15 ENCOUNTER — PATIENT MESSAGE (OUTPATIENT)
Dept: SURGERY | Facility: CLINIC | Age: 59
End: 2024-12-15
Payer: COMMERCIAL

## 2024-12-17 ENCOUNTER — TELEPHONE (OUTPATIENT)
Dept: SURGERY | Facility: CLINIC | Age: 59
End: 2024-12-17
Payer: COMMERCIAL

## 2024-12-17 DIAGNOSIS — K60.30 FISTULA-IN-ANO: Primary | ICD-10-CM

## 2024-12-17 NOTE — TELEPHONE ENCOUNTER
"Spoke with pt regarding arrival time for procedure. Informed that [facility] may reach out again with her arrival time but she should arrive for 12 PM. Pt states, "she is still not feeling well and would like to reschedule to later this year if possible." Denies fever but reports not feeling well and congested. Informed that provider will be notified for possible date in 2024 before cancelling at [facility]. Pt states, "if unable to reschedule, she will come in tomorrow." Informed that response will be sent on portal since she is going to rest. No further questions at this time.         ----- Message from Melodie sent at 12/17/2024  8:30 AM CST -----  Regarding: pt advice  Contact: [phone]  .Name Of Caller: Self     Contact Preference?: [phone]     What is the nature of the call?: needing time for procedure 12/18/24 pls call      Additional Notes:  "Thank you for all that you do for our patients"  "

## 2024-12-19 NOTE — PRE-PROCEDURE INSTRUCTIONS
PreOp Instructions given:   - Verbal medication information (what to hold and what to take)   - NPO guidelines and bowel prep instructions given/CRS Dept  - Arrival place directions given; time to be given the day before procedure by the   Surgeon's Office [facility]  - Bathing with antibacterial soap   - Don't wear any jewelry or bring any valuables AM of surgery   - No makeup or moisturizer to face   - No perfume/cologne, powder, lotions or aftershave   Pt. verbalized understanding.   Pt denies any h/o Anesthesia/Sedation complications or side effects.  Patient does not know arrival time.  Explained that this information comes from the surgeon's office and if they haven't heard from them by 2 or 3 pm to call the office.  Patient stated an understanding.

## 2024-12-25 ENCOUNTER — ANESTHESIA EVENT (OUTPATIENT)
Dept: SURGERY | Facility: HOSPITAL | Age: 59
End: 2024-12-25
Payer: COMMERCIAL

## 2024-12-25 ENCOUNTER — PATIENT MESSAGE (OUTPATIENT)
Dept: SURGERY | Facility: CLINIC | Age: 59
End: 2024-12-25
Payer: COMMERCIAL

## 2024-12-26 ENCOUNTER — ANESTHESIA (OUTPATIENT)
Dept: SURGERY | Facility: HOSPITAL | Age: 59
End: 2024-12-26
Payer: COMMERCIAL

## 2024-12-26 ENCOUNTER — HOSPITAL ENCOUNTER (OUTPATIENT)
Facility: HOSPITAL | Age: 59
Discharge: HOME | End: 2024-12-26
Attending: SURGERY | Admitting: SURGERY
Payer: COMMERCIAL

## 2024-12-26 VITALS
OXYGEN SATURATION: 95 % | TEMPERATURE: 98 F | BODY MASS INDEX: 30.29 KG/M2 | HEIGHT: 67 IN | SYSTOLIC BLOOD PRESSURE: 149 MMHG | HEART RATE: 55 BPM | DIASTOLIC BLOOD PRESSURE: 66 MMHG | WEIGHT: 193 LBS | RESPIRATION RATE: 17 BRPM

## 2024-12-26 DIAGNOSIS — L98.8 FISTULA: ICD-10-CM

## 2024-12-26 DIAGNOSIS — K60.30 FISTULA-IN-ANO: Primary | ICD-10-CM

## 2024-12-26 PROCEDURE — 36000704 HC OR TIME LEV I 1ST 15 MIN: Performed by: SURGERY

## 2024-12-26 PROCEDURE — 71000044 HC DOSC ROUTINE RECOVERY FIRST HOUR: Performed by: SURGERY

## 2024-12-26 PROCEDURE — 63600175 PHARM REV CODE 636 W HCPCS

## 2024-12-26 PROCEDURE — 63600175 PHARM REV CODE 636 W HCPCS: Performed by: STUDENT IN AN ORGANIZED HEALTH CARE EDUCATION/TRAINING PROGRAM

## 2024-12-26 PROCEDURE — 25000003 PHARM REV CODE 250

## 2024-12-26 PROCEDURE — 37000009 HC ANESTHESIA EA ADD 15 MINS: Performed by: SURGERY

## 2024-12-26 PROCEDURE — 63600175 PHARM REV CODE 636 W HCPCS: Mod: JG | Performed by: SURGERY

## 2024-12-26 PROCEDURE — 71000015 HC POSTOP RECOV 1ST HR: Performed by: SURGERY

## 2024-12-26 PROCEDURE — 46020 PLACEMENT OF SETON: CPT | Mod: ,,, | Performed by: SURGERY

## 2024-12-26 PROCEDURE — 37000008 HC ANESTHESIA 1ST 15 MINUTES: Performed by: SURGERY

## 2024-12-26 PROCEDURE — 36000705 HC OR TIME LEV I EA ADD 15 MIN: Performed by: SURGERY

## 2024-12-26 RX ORDER — PROCHLORPERAZINE EDISYLATE 5 MG/ML
5 INJECTION INTRAMUSCULAR; INTRAVENOUS EVERY 30 MIN PRN
Status: DISCONTINUED | OUTPATIENT
Start: 2024-12-26 | End: 2024-12-26 | Stop reason: HOSPADM

## 2024-12-26 RX ORDER — OXYCODONE HYDROCHLORIDE 5 MG/1
5 TABLET ORAL EVERY 4 HOURS PRN
Qty: 12 TABLET | Refills: 0 | Status: SHIPPED | OUTPATIENT
Start: 2024-12-26

## 2024-12-26 RX ORDER — LIDOCAINE HYDROCHLORIDE 10 MG/ML
1 INJECTION, SOLUTION EPIDURAL; INFILTRATION; INTRACAUDAL; PERINEURAL ONCE
Status: COMPLETED | OUTPATIENT
Start: 2024-12-26 | End: 2024-12-26

## 2024-12-26 RX ORDER — LIDOCAINE HYDROCHLORIDE 20 MG/ML
INJECTION INTRAVENOUS
Status: DISCONTINUED | OUTPATIENT
Start: 2024-12-26 | End: 2024-12-26

## 2024-12-26 RX ORDER — SODIUM CHLORIDE 9 MG/ML
INJECTION, SOLUTION INTRAVENOUS CONTINUOUS
Status: DISCONTINUED | OUTPATIENT
Start: 2024-12-26 | End: 2024-12-26 | Stop reason: HOSPADM

## 2024-12-26 RX ORDER — GLUCAGON 1 MG
1 KIT INJECTION
Status: DISCONTINUED | OUTPATIENT
Start: 2024-12-26 | End: 2024-12-26 | Stop reason: HOSPADM

## 2024-12-26 RX ORDER — PROPOFOL 10 MG/ML
VIAL (ML) INTRAVENOUS
Status: DISCONTINUED | OUTPATIENT
Start: 2024-12-26 | End: 2024-12-26

## 2024-12-26 RX ORDER — OXYCODONE HYDROCHLORIDE 5 MG/1
5 TABLET ORAL
Status: DISCONTINUED | OUTPATIENT
Start: 2024-12-26 | End: 2024-12-26 | Stop reason: HOSPADM

## 2024-12-26 RX ORDER — HYDROMORPHONE HYDROCHLORIDE 1 MG/ML
0.2 INJECTION, SOLUTION INTRAMUSCULAR; INTRAVENOUS; SUBCUTANEOUS EVERY 5 MIN PRN
Status: DISCONTINUED | OUTPATIENT
Start: 2024-12-26 | End: 2024-12-26 | Stop reason: HOSPADM

## 2024-12-26 RX ORDER — SODIUM CHLORIDE 0.9 % (FLUSH) 0.9 %
10 SYRINGE (ML) INJECTION
Status: DISCONTINUED | OUTPATIENT
Start: 2024-12-26 | End: 2024-12-26 | Stop reason: HOSPADM

## 2024-12-26 RX ORDER — OXYCODONE HYDROCHLORIDE 10 MG/1
10 TABLET ORAL EVERY 4 HOURS PRN
Status: DISCONTINUED | OUTPATIENT
Start: 2024-12-26 | End: 2024-12-26 | Stop reason: HOSPADM

## 2024-12-26 RX ORDER — ONDANSETRON HYDROCHLORIDE 2 MG/ML
INJECTION, SOLUTION INTRAVENOUS
Status: DISCONTINUED | OUTPATIENT
Start: 2024-12-26 | End: 2024-12-26

## 2024-12-26 RX ORDER — BUPIVACAINE HYDROCHLORIDE 5 MG/ML
INJECTION, SOLUTION EPIDURAL; INTRACAUDAL
Status: DISCONTINUED | OUTPATIENT
Start: 2024-12-26 | End: 2024-12-26 | Stop reason: HOSPADM

## 2024-12-26 RX ORDER — DEXMEDETOMIDINE HYDROCHLORIDE 100 UG/ML
INJECTION, SOLUTION INTRAVENOUS
Status: DISCONTINUED | OUTPATIENT
Start: 2024-12-26 | End: 2024-12-26

## 2024-12-26 RX ORDER — OXYCODONE HYDROCHLORIDE 5 MG/1
5 TABLET ORAL EVERY 4 HOURS PRN
Status: DISCONTINUED | OUTPATIENT
Start: 2024-12-26 | End: 2024-12-26 | Stop reason: HOSPADM

## 2024-12-26 RX ORDER — PROPOFOL 10 MG/ML
VIAL (ML) INTRAVENOUS CONTINUOUS PRN
Status: DISCONTINUED | OUTPATIENT
Start: 2024-12-26 | End: 2024-12-26

## 2024-12-26 RX ORDER — MIDAZOLAM HYDROCHLORIDE 1 MG/ML
INJECTION INTRAMUSCULAR; INTRAVENOUS
Status: DISCONTINUED | OUTPATIENT
Start: 2024-12-26 | End: 2024-12-26

## 2024-12-26 RX ORDER — FENTANYL CITRATE 50 UG/ML
INJECTION, SOLUTION INTRAMUSCULAR; INTRAVENOUS
Status: DISCONTINUED | OUTPATIENT
Start: 2024-12-26 | End: 2024-12-26

## 2024-12-26 RX ADMIN — FENTANYL CITRATE 25 MCG: 50 INJECTION, SOLUTION INTRAMUSCULAR; INTRAVENOUS at 11:12

## 2024-12-26 RX ADMIN — ONDANSETRON 4 MG: 2 INJECTION INTRAMUSCULAR; INTRAVENOUS at 11:12

## 2024-12-26 RX ADMIN — PROPOFOL 100 MCG/KG/MIN: 10 INJECTION, EMULSION INTRAVENOUS at 11:12

## 2024-12-26 RX ADMIN — LIDOCAINE HYDROCHLORIDE 2 MG: 10 INJECTION, SOLUTION EPIDURAL; INFILTRATION; INTRACAUDAL; PERINEURAL at 10:12

## 2024-12-26 RX ADMIN — DEXMEDETOMIDINE 4 MCG: 200 INJECTION, SOLUTION INTRAVENOUS at 11:12

## 2024-12-26 RX ADMIN — PROPOFOL 10 MG: 10 INJECTION, EMULSION INTRAVENOUS at 11:12

## 2024-12-26 RX ADMIN — PROPOFOL 20 MG: 10 INJECTION, EMULSION INTRAVENOUS at 11:12

## 2024-12-26 RX ADMIN — SODIUM CHLORIDE: 0.9 INJECTION, SOLUTION INTRAVENOUS at 11:12

## 2024-12-26 RX ADMIN — LIDOCAINE HYDROCHLORIDE 20 MG: 20 INJECTION INTRAVENOUS at 11:12

## 2024-12-26 RX ADMIN — MIDAZOLAM HYDROCHLORIDE 2 MG: 2 INJECTION, SOLUTION INTRAMUSCULAR; INTRAVENOUS at 10:12

## 2024-12-26 NOTE — BRIEF OP NOTE
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 EBL = 5cc.         Specimens:   Specimen (24h ago, onward)      None            [id number]

## 2024-12-26 NOTE — ANESTHESIA PREPROCEDURE EVALUATION
[facility]  Anesthesia Pre-Operative Evaluation         Patient Name: Rhea Archuleta  YOB: 1965  MRN: [id number]    SUBJECTIVE:     Pre-operative evaluation for Procedure(s) (LRB):  Exam under anesthesia with possible fistulotomy (N/A)     12/25/2024    Rhea Archuleta is a 59 y.o. female w/ a significant PMHx of former smoker, vulvar intraepithelial neoplasia (NEELA) grade 3, recurrent perianal abscess.    Now presenting for the above procedure(s).     2D ECHO:  TTE:  No results found for this or any previous visit.      PAWEL:  No results found for this or any previous visit.    Prev airway: 12/2023 easy mask grade I with Mac3    Patient Active Problem List   Diagnosis    Vulvar intraepithelial neoplasia (NEELA) grade 3    Perianal erythema    Cystocele, midline    Nipple pain    Fistula-in-ano       Review of patient's allergies indicates:   Allergen Reactions    Clarithromycin Hives and Other (See Comments)     unknown         Current Medications:  Current Outpatient Medications   Medication Instructions    amoxicillin-clavulanate 875-125mg (AUGMENTIN) 875-125 mg per tablet 1 tablet, Oral, Every 12 hours    buPROPion (WELLBUTRIN XL) 150 MG TB24 tablet 1 tablet, Daily    levothyroxine (TIROSINT) 88 mcg, Oral, Before breakfast    oxyCODONE (ROXICODONE) 5 mg, Oral, Every 4 hours PRN    tamsulosin HCl (FLOMAX ORAL) 0.4 mg, Oral, Daily       Past Surgical History:   Procedure Laterality Date    CERVICAL BIOPSY  W/ LOOP ELECTRODE EXCISION      EXAMINATION UNDER ANESTHESIA N/A 7/19/2024    Procedure: Exam under anesthesia, I&D OF PERIRECTAL ABSCESS, FLEXIBLE SIGMOIDOSCOPY;  Surgeon: Mayuri Velazquez MD;  Location: [facility] OR;  Service: Colon and Rectal;  Laterality: N/A;    FLEXIBLE SIGMOIDOSCOPY N/A 7/19/2024    Procedure: SIGMOIDOSCOPY, FLEXIBLE;  Surgeon: Mayuri Velazquez MD;  Location: [facility] OR;  Service: Colon and Rectal;  Laterality: N/A;    TUBAL LIGATION           OBJECTIVE:  "    Vital Signs Range (Last 24H):         Significant Labs:  No results found for: "WBC", "HGB", "HCT", "PLT", "INR"    Lab Results   Component Value Date     12/29/2023    K 4.3 12/29/2023     12/29/2023    CREATININE 0.73 12/29/2023    BUN 12 12/29/2023    CO2 26 12/29/2023       No results found for: "TSH", "HGBA1C"    EKG:   No results found for this or any previous visit.    ASSESSMENT/PLAN:        Pre-op Assessment    I have reviewed the Patient Summary Reports.     I have reviewed the Nursing Notes. I have reviewed the NPO Status.   I have reviewed the Medications.     Review of Systems  Anesthesia Hx:  No previous Anesthesia                Social:  Non-Smoker       Hematology/Oncology:  Hematology Normal   Oncology Normal                                   EENT/Dental:  EENT/Dental Normal           Cardiovascular:  Exercise tolerance: good                                             Pulmonary:  Pulmonary Normal                       Renal/:  Renal/ Normal                 Hepatic/GI:  Hepatic/GI Normal       Recurrent perianal abscess             OB/GYN/PEDS:  Vulvar intraepithelial neoplasia (NEELA) grade 3           Neurological:  Neurology Normal                                      Endocrine:  Endocrine Normal            Psych:  Psychiatric Normal                    Physical Exam  General: Well nourished, Cooperative and Alert    Airway:  Mallampati: I   Mouth Opening: Normal  TM Distance: Normal  Tongue: Normal  Neck ROM: Normal ROM    Dental:  Intact        Anesthesia Plan  Type of Anesthesia, risks & benefits discussed:    Anesthesia Type: Gen Supraglottic Airway, Gen Natural Airway, MAC  Intra-op Monitoring Plan: Standard ASA Monitors  Post Op Pain Control Plan: multimodal analgesia and IV/PO Opioids PRN  Induction:  IV  Informed Consent: Informed consent signed with the Patient and all parties understand the risks and agree with anesthesia plan.  All questions answered.   ASA Score: " 2  Day of Surgery Review of History & Physical: H&P Update referred to the surgeon/provider.    Ready For Surgery From Anesthesia Perspective.     .

## 2024-12-26 NOTE — DISCHARGE INSTRUCTIONS
Anal Surgery Post Op Instructions:    You had a seton placement performed today.     Wound care:  Expect some drainage over the next few weeks as the wound heals.  Please wear a pad to help with drainage. There is a piece of gauze packed in the external wound, that can be removed with your first bowel movement or sooner if it falls out.     You have no activity restrictions.   No dietary restrictions.    Medications:  A narcotic pain medication has been prescribed.  Please start to wean the pain medication over the following few days.  You can take tylenol and motrin also.      Please take miralax 1 capful at night to prevent constipation associated with narcotic pain medications.      Follow up:  Return to clinic in 4 weeks for follow up and wound check.    KENJI Lawrence MD  Staff Surgeon  Colon & Rectal Surgery

## 2024-12-26 NOTE — H&P
HPI:   Rhea Archuleta is a 59 y.o. female presents with recurrent perianal abscess.  Has had surgery 5/23 and 12/23 with Dr. Khan in [place].  Had large right perianal abscess drained 12/23.  No fistula identified.  Reports recurrence of symptoms.  Feels discomfort that is positional.  No drainage.  Has had a colonoscopy, thinks about 7 years ago and was normal.       7/19/24 EUA with debridement of abscess cavity, flexible sigmoidoscopy      Interval history:   Reports recurrent fluctuance at previous incision/drain site as well as secondary lesion near the perineum/thigh.  Painful to sit.  No drainage.  Recently had dental procedure and was taking amoxicillin without improvement.      Objective:       Vitals:     11/11/24 1528   BP: (!) 145/69   Pulse: 63   Resp: 16         Physical Exam   Gen: well developed female, NAD  HEENT: normocephalic, atraumatic, PERRL, EOMI   CV: RRR, no murmurs  Resp: nonlabored, CTAB   Abd: soft, NTND   MSK: no gross deformities, no cyanosis or edema   Anorectal: right lateral scar with mild fluctuance, no erythema or drainage, deep medial/posterior thigh/perineal fullness/induration without overlying erythema     Assessment and Plan:   Rhea Archuleta  is a 59 y.o. female who presents for follow up of recurrent perianal abscess     - Previous surgical incision healed, now with concern for recurrent abscess.    -To OR for EUA

## 2024-12-26 NOTE — PLAN OF CARE
Patient states they are ready to be discharged. Instructions and prescription given to patient and family. Both verbalize understanding. Patient tolerating po liquids with no difficulty. Patient states pain is at a tolerable level for them. Anesthesia consent and surgical consent in chart upon patient's discharge from [facility].

## 2024-12-26 NOTE — TRANSFER OF CARE
"Anesthesia Transfer of Care Note    Patient: Rhea Archuleta    Procedure(s) Performed: Procedure(s) (LRB):  Exam under anesthesia with possible fistulotomy (N/A)  PLACEMENT, SETON STITCH    Patient location: [facility]    Anesthesia Type: general and MAC    Transport from OR: Transported from OR on 6-10 L/min O2 by face mask with adequate spontaneous ventilation    Post pain: adequate analgesia    Post assessment: no apparent anesthetic complications    Post vital signs: stable    Level of consciousness: awake    Nausea/Vomiting: no nausea/vomiting    Complications: none    Transfer of care protocol was followed      Last vitals: Visit Vitals  BP (!) 156/71 (BP Location: Right arm, Patient Position: Lying)   Pulse 62   Temp 36.6 °C (97.9 °F) (Temporal)   Resp 18   Ht 5' 7" (1.702 m)   Wt 87.5 kg (193 lb)   SpO2 96%   Breastfeeding No   BMI 30.23 kg/m²     "

## 2024-12-26 NOTE — ANESTHESIA POSTPROCEDURE EVALUATION
Anesthesia Post Evaluation    Patient: Rhea Archuleta    Procedure(s) Performed: Procedure(s) (LRB):  Exam under anesthesia with possible fistulotomy (N/A)  PLACEMENT, SETON STITCH    Final Anesthesia Type: general      Patient location during evaluation: PACU  Patient participation: Yes- Able to Participate  Level of consciousness: awake and alert  Post-procedure vital signs: reviewed and stable  Pain management: adequate  Airway patency: patent    PONV status at discharge: No PONV  Anesthetic complications: no      Cardiovascular status: blood pressure returned to baseline  Respiratory status: unassisted  Hydration status: euvolemic  Follow-up not needed.              Vitals Value Taken Time   /66 12/26/24 1248   Temp 36.8 °C (98.2 °F) 12/26/24 1245   Pulse 58 12/26/24 1249   Resp 28 12/26/24 1249   SpO2 96 % 12/26/24 1249   Vitals shown include unfiled device data.      No case tracking events are documented in the log.      Pain/Ronel Score: Ronel Score: 9 (12/26/2024 12:15 PM)

## 2024-12-26 NOTE — OP NOTE
Date of surgery: 12/26/2024    Operative Report  Pre-operative diagnosis: recurrent perianal abscess  Post-operative diagnosis: Same as above    Procedure:  Anal exam under anesthesia, seton placement     Findings:  Transsphinteric fistula          Surgeon: Mayuri Velazquez MD   Assistant:  Nahid Alvarado MD    Indication: Ms. Archuleta is a 59 year old woman with a history of recurrent perianal abscess with multiple prior interventions  who is scheduled to undergo exam under anesthesia with possible fistulotomy vs. seton. The benefits, risks, and alternatives were discussed with the patient, they were given the opportunity to ask questions and they elected to proceed with operative intervention after signing written consent.    Procedure:  After pre-operative assessment and review of informed consent, the patient was taken to the operating room and received general anesthesia. Pre-operative antibiotics were administered if indicated and the patient was placed in lithotomy position. The perineum was prepped and draped in the usual sterile fashion and a timeout was performed according to [facility] Quality and Safety guidelines.      A perianal block was performed.  The patient had a well healed scar in the right lateral position.  Anoscopy was performed with a [place] retractor.  There was a firm area of protruding mucosa without erythema in the anterior midline.  There were no other defects.  The skin over the prior scar was incised with a scalpel.  This was probed with a fistula probe.  There were two tracts identified.  Hydrogen peroxide was instilled in the wound but we were not able to obtain a high enough pressure to see bubbling in the anal canal.  The probe was gently passed and we were able to identify a tract to the internal abnormality.  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 A seton was guided into the tract and secured with a silk tie.  The remaining fistula tract  was debrided with a curette.  The wound was irrigated and hemostasis obtained.       Prior to closure and after final closure instrument, needle, and sponge counts were correct.    The procedure was completed without complication and was well-tolerated. The patient was then brought to the post-anesthesia care unit in stable condition. I was present for the entire operation.    Complications: None  Estimated blood loss: 20  mL  Disposition: PACU    Mayuri Velazquez MD, FACS  Staff Surgeon   Colon & Rectal Surgery

## 2024-12-27 ENCOUNTER — PATIENT MESSAGE (OUTPATIENT)
Dept: SURGERY | Facility: CLINIC | Age: 59
End: 2024-12-27
Payer: COMMERCIAL

## 2025-01-03 ENCOUNTER — PATIENT MESSAGE (OUTPATIENT)
Dept: SURGERY | Facility: CLINIC | Age: 60
End: 2025-01-03
Payer: COMMERCIAL

## 2025-01-14 ENCOUNTER — PATIENT MESSAGE (OUTPATIENT)
Dept: SURGERY | Facility: CLINIC | Age: 60
End: 2025-01-14
Payer: COMMERCIAL

## 2025-01-16 ENCOUNTER — TELEPHONE (OUTPATIENT)
Dept: SURGERY | Facility: CLINIC | Age: 60
End: 2025-01-16
Payer: COMMERCIAL

## 2025-01-17 ENCOUNTER — OFFICE VISIT (OUTPATIENT)
Dept: SURGERY | Facility: CLINIC | Age: 60
End: 2025-01-17
Payer: COMMERCIAL

## 2025-01-17 DIAGNOSIS — K60.30 FISTULA-IN-ANO: Primary | ICD-10-CM

## 2025-01-17 PROCEDURE — 99024 POSTOP FOLLOW-UP VISIT: CPT | Mod: S$GLB,,, | Performed by: SURGERY

## 2025-01-17 PROCEDURE — 99999 PR PBB SHADOW E&M-EST. PATIENT-LVL I: CPT | Mod: PBBFAC,,, | Performed by: SURGERY

## 2025-01-17 RX ORDER — AMOXICILLIN AND CLAVULANATE POTASSIUM 875; 125 MG/1; MG/1
1 TABLET, FILM COATED ORAL EVERY 12 HOURS
Qty: 20 TABLET | Refills: 0 | Status: SHIPPED | OUTPATIENT
Start: 2025-01-17

## 2025-01-24 NOTE — PROGRESS NOTES
Colon & Rectal Surgery Clinic Follow Up    HPI:   Rhea Archuleta is a 59 y.o. female who presents for follow up of fistula in ano    7/19/24 EUA with debridement of perirectal abscess cavity    12/26/24 EUA with placement of seton    Interval history: healing well, still with firmness at incision site.  Having regular bowel function.     Objective:     Physical Exam   Gen: well developed female, NAD  HEENT: normocephalic, atraumatic, PERRL, EOMI   CV: RRR, no murmurs  Resp: nonlabored, CTAB   Abd: soft, NTND   MSK: no gross deformities, no cyanosis or edema  Anorectal: right anterolateral seton in place with granulation tissue, no fluctuance or cellulitis       Assessment and Plan:   Rhea Archuleta  is a 59 y.o. female who presents for follow up of fistula in ano    - patient healing well, partial fistulotomy incision healing   - we discussed next steps in fistula repair   - she is leaving town for a month, will follow up for second stage repair when she returns       Mayuri Velazquez MD, FACS   Staff Surgeon   Colon & Rectal Surgery

## 2025-03-04 ENCOUNTER — PATIENT MESSAGE (OUTPATIENT)
Dept: SURGERY | Facility: CLINIC | Age: 60
End: 2025-03-04
Payer: COMMERCIAL

## 2025-03-07 ENCOUNTER — OFFICE VISIT (OUTPATIENT)
Dept: SURGERY | Facility: CLINIC | Age: 60
End: 2025-03-07
Payer: COMMERCIAL

## 2025-03-07 VITALS
HEIGHT: 67 IN | DIASTOLIC BLOOD PRESSURE: 71 MMHG | SYSTOLIC BLOOD PRESSURE: 153 MMHG | BODY MASS INDEX: 31.63 KG/M2 | HEART RATE: 61 BPM | WEIGHT: 201.5 LBS

## 2025-03-07 DIAGNOSIS — K60.30 FISTULA-IN-ANO: Primary | ICD-10-CM

## 2025-03-07 PROCEDURE — 1159F MED LIST DOCD IN RCRD: CPT | Mod: CPTII,S$GLB,, | Performed by: SURGERY

## 2025-03-07 PROCEDURE — 3077F SYST BP >= 140 MM HG: CPT | Mod: CPTII,S$GLB,, | Performed by: SURGERY

## 2025-03-07 PROCEDURE — 99999 PR PBB SHADOW E&M-EST. PATIENT-LVL IV: CPT | Mod: PBBFAC,,, | Performed by: SURGERY

## 2025-03-07 PROCEDURE — 99024 POSTOP FOLLOW-UP VISIT: CPT | Mod: S$GLB,,, | Performed by: SURGERY

## 2025-03-07 PROCEDURE — 3078F DIAST BP <80 MM HG: CPT | Mod: CPTII,S$GLB,, | Performed by: SURGERY

## 2025-03-11 NOTE — PROGRESS NOTES
Colon & Rectal Surgery Clinic Follow Up    HPI:   Rhea Archuleta is a 59 y.o. female who presents for follow up of fistula in ano     7/19/24 EUA with debridement of perirectal abscess cavity     12/26/24 EUA with placement of seton     Interval history:   Has been doing well with seton.  Small amount of drainage daily.       Objective:   Vitals:    03/07/25 0939   BP: (!) 153/71   Pulse: 61        Physical Exam   Gen: well developed female, NAD  HEENT: normocepahlic, atraumatic, PERRL, EOMI  CV: RRR, no murmurs  Resp: nonlabored, CTAB   Abd: soft, NTND   MSK: no gross deformities, no cyanosis or edema   Anorectal: seton in place in right anterolateral position     Assessment and Plan:   Rhea Archuleta  is a 59 y.o. female who presents for follow up of fistula in ano     We reviewed the cryptoglandular etiology of anorectal abscess and fistula-in-ano using visual diagrams. We reviewed that ~20% of patients who have had an abscess will develop a fistula.     We reviewed treatment options:  (1) Clinical observation- this would avoid the possible need for multiple procedures, but will a low likelihood that the fistula wound resolve.  (2) Exam under anesthesia- at this time, we reviewed that if <30% of the sphincter were involved I would perform a fistulotomy, which is associated with >90% healing.  If >30% of the sphincter were involved , I would place a seton.  This would remain in place for at least 3 months, and would then require a second procedure for repair (advancement flap or ligation of intersphincteric fistula [LIFT]).  We reviewed that success rates for these procedures are ~60-70%.     We reviewed expectations following the procedure, including pain, bleeding, possible changes in continence to gas or liquid stool.  Asked if any additional questions, none at this time.    Given anterior location of fistula, will plan for LIFT vs. Endorectal advancement flap.  Consent signed.       Mayuri Velazquez  MD, FACS   Staff Surgeon   Colon & Rectal Surgery

## 2025-04-03 ENCOUNTER — PATIENT MESSAGE (OUTPATIENT)
Dept: SURGERY | Facility: CLINIC | Age: 60
End: 2025-04-03
Payer: COMMERCIAL

## 2025-04-22 RX ORDER — BUPROPION HYDROCHLORIDE 75 MG/1
75 TABLET ORAL DAILY
Status: ON HOLD | COMMUNITY
End: 2025-04-24 | Stop reason: HOSPADM

## 2025-04-23 ENCOUNTER — ANESTHESIA EVENT (OUTPATIENT)
Dept: SURGERY | Facility: HOSPITAL | Age: 60
End: 2025-04-23
Payer: COMMERCIAL

## 2025-04-23 ENCOUNTER — TELEPHONE (OUTPATIENT)
Dept: SURGERY | Facility: CLINIC | Age: 60
End: 2025-04-23
Payer: COMMERCIAL

## 2025-04-23 NOTE — TELEPHONE ENCOUNTER
Spoke with pt regarding 0500 arrival time and location for surgery. Pt denies further questions or concerns at this time.

## 2025-04-23 NOTE — ANESTHESIA PREPROCEDURE EVALUATION
[facility]  Anesthesia Pre-Operative Evaluation         Patient Name: Rhea Archuleta  YOB: 1965  MRN: [id number]    SUBJECTIVE:     Pre-operative evaluation for Procedure(s) (LRB):  Exam under anesthesia with possible fistulotomy (N/A)     04/23/2025    Rhea Archuleta is a 59 y.o. female w/ a significant PMHx of former smoker, vulvar intraepithelial neoplasia (NEELA) grade 3, recurrent perianal abscess.    Now presenting for the above procedure(s).     2D ECHO:  TTE:  No results found for this or any previous visit.      PAWEL:  No results found for this or any previous visit.    Prev airway: 12/2023 easy mask grade I with Mac3    Patient Active Problem List   Diagnosis    Vulvar intraepithelial neoplasia (NEELA) grade 3    Perianal erythema    Cystocele, midline    Nipple pain    Fistula-in-ano    Fistula       Review of patient's allergies indicates:   Allergen Reactions    Clarithromycin Hives and Other (See Comments)     unknown         Current Medications:  Current Outpatient Medications   Medication Instructions    amoxicillin-clavulanate 875-125mg (AUGMENTIN) 875-125 mg per tablet 1 tablet, Oral, Every 12 hours    buPROPion (WELLBUTRIN XL) 150 MG TB24 tablet 1 tablet, Daily    buPROPion (WELLBUTRIN) 75 mg, Oral, Daily    levothyroxine (TIROSINT) 88 mcg, Before breakfast    oxyCODONE (ROXICODONE) 5 mg, Oral, Every 4 hours PRN    tamsulosin HCl (FLOMAX ORAL) 0.4 mg, Daily       Past Surgical History:   Procedure Laterality Date    CERVICAL BIOPSY  W/ LOOP ELECTRODE EXCISION      EXAMINATION UNDER ANESTHESIA N/A 7/19/2024    Procedure: Exam under anesthesia, I&D OF PERIRECTAL ABSCESS, FLEXIBLE SIGMOIDOSCOPY;  Surgeon: Mayuri Velazquez MD;  Location: [facility] [place], operated by [facility] OR;  Service: Colon and Rectal;  Laterality: N/A;    EXAMINATION UNDER ANESTHESIA N/A 12/26/2024    Procedure: Exam under anesthesia with possible fistulotomy;  Surgeon: Mayuri Velazquez MD;  [facility];   "Service: Colon and Rectal;  Laterality: N/A;    FLEXIBLE SIGMOIDOSCOPY N/A 7/19/2024    Procedure: SIGMOIDOSCOPY, FLEXIBLE;  Surgeon: Mayuri Velazquez MD;  Location: [facility] OR;  Service: Colon and Rectal;  Laterality: N/A;    INSERTION OF SETON STITCH  12/26/2024    Procedure: PLACEMENT, SETON STITCH;  Surgeon: Mayuri Velazquez MD;  Location: [facility] OR 2ND FLR;  Service: Colon and Rectal;;    TUBAL LIGATION           OBJECTIVE:     Vital Signs Range (Last 24H):         Significant Labs:  No results found for: "WBC", "HGB", "HCT", "PLT", "INR"    Lab Results   Component Value Date     12/29/2023    K 4.3 12/29/2023     12/29/2023    CREATININE 0.73 12/29/2023    BUN 12 12/29/2023    CO2 26 12/29/2023       No results found for: "TSH", "HGBA1C"    EKG:   No results found for this or any previous visit.    ASSESSMENT/PLAN:        Pre-op Assessment    I have reviewed the Patient Summary Reports.     I have reviewed the Nursing Notes. I have reviewed the NPO Status.   I have reviewed the Medications.     Review of Systems  Anesthesia Hx:  No previous Anesthesia                Social:  Non-Smoker       Hematology/Oncology:  Hematology Normal   Oncology Normal                                   EENT/Dental:  EENT/Dental Normal           Cardiovascular:  Exercise tolerance: good                                             Pulmonary:  Pulmonary Normal                       Renal/:  Renal/ Normal                 Hepatic/GI:  Hepatic/GI Normal       Recurrent perianal abscess             OB/GYN/PEDS:  Vulvar intraepithelial neoplasia (NEELA) grade 3           Neurological:  Neurology Normal                                      Endocrine:  Endocrine Normal            Psych:  Psychiatric Normal                    Physical Exam  General: Well nourished, Cooperative and Alert    Airway:  Mallampati: I   Mouth Opening: Normal  TM Distance: Normal  Tongue: Normal  Neck ROM: Normal ROM    Dental:  Intact        Anesthesia " Plan  Type of Anesthesia, risks & benefits discussed:    Anesthesia Type: Gen ETT  Intra-op Monitoring Plan: Standard ASA Monitors  Post Op Pain Control Plan: multimodal analgesia and IV/PO Opioids PRN  Induction:  IV  Airway Plan: Direct and Video, Post-Induction  Informed Consent: Informed consent signed with the Patient and all parties understand the risks and agree with anesthesia plan.  All questions answered.   ASA Score: 2  Day of Surgery Review of History & Physical: H&P Update referred to the surgeon/provider.    Ready For Surgery From Anesthesia Perspective.     .

## 2025-04-24 ENCOUNTER — HOSPITAL ENCOUNTER (OUTPATIENT)
Facility: HOSPITAL | Age: 60
Discharge: HOME | End: 2025-04-24
Attending: SURGERY | Admitting: SURGERY
Payer: COMMERCIAL

## 2025-04-24 ENCOUNTER — ANESTHESIA (OUTPATIENT)
Dept: SURGERY | Facility: HOSPITAL | Age: 60
End: 2025-04-24
Payer: COMMERCIAL

## 2025-04-24 VITALS
TEMPERATURE: 98 F | RESPIRATION RATE: 18 BRPM | HEART RATE: 66 BPM | SYSTOLIC BLOOD PRESSURE: 131 MMHG | HEIGHT: 67 IN | BODY MASS INDEX: 31.63 KG/M2 | WEIGHT: 201.5 LBS | DIASTOLIC BLOOD PRESSURE: 72 MMHG | OXYGEN SATURATION: 99 %

## 2025-04-24 DIAGNOSIS — K60.30 FISTULA-IN-ANO: Primary | ICD-10-CM

## 2025-04-24 PROCEDURE — 25000003 PHARM REV CODE 250: Performed by: NURSE PRACTITIONER

## 2025-04-24 PROCEDURE — 36000706: Performed by: SURGERY

## 2025-04-24 PROCEDURE — 71000015 HC POSTOP RECOV 1ST HR: Performed by: SURGERY

## 2025-04-24 PROCEDURE — 25000003 PHARM REV CODE 250

## 2025-04-24 PROCEDURE — 71000044 HC DOSC ROUTINE RECOVERY FIRST HOUR: Performed by: SURGERY

## 2025-04-24 PROCEDURE — 63600175 PHARM REV CODE 636 W HCPCS

## 2025-04-24 PROCEDURE — 46020 PLACEMENT OF SETON: CPT | Mod: 51,,, | Performed by: SURGERY

## 2025-04-24 PROCEDURE — 46275 REMOVE ANAL FIST INTER: CPT | Mod: ,,, | Performed by: SURGERY

## 2025-04-24 PROCEDURE — 63600175 PHARM REV CODE 636 W HCPCS: Performed by: SURGERY

## 2025-04-24 PROCEDURE — 36000707: Performed by: SURGERY

## 2025-04-24 PROCEDURE — 37000008 HC ANESTHESIA 1ST 15 MINUTES: Performed by: SURGERY

## 2025-04-24 PROCEDURE — 37000009 HC ANESTHESIA EA ADD 15 MINS: Performed by: SURGERY

## 2025-04-24 RX ORDER — FENTANYL CITRATE 50 UG/ML
25 INJECTION, SOLUTION INTRAMUSCULAR; INTRAVENOUS EVERY 5 MIN PRN
Status: DISCONTINUED | OUTPATIENT
Start: 2025-04-24 | End: 2025-04-24 | Stop reason: HOSPADM

## 2025-04-24 RX ORDER — LIDOCAINE HYDROCHLORIDE 20 MG/ML
INJECTION INTRAVENOUS
Status: DISCONTINUED | OUTPATIENT
Start: 2025-04-24 | End: 2025-04-24

## 2025-04-24 RX ORDER — SODIUM CHLORIDE 0.9 % (FLUSH) 0.9 %
3 SYRINGE (ML) INJECTION
Status: DISCONTINUED | OUTPATIENT
Start: 2025-04-24 | End: 2025-04-24 | Stop reason: HOSPADM

## 2025-04-24 RX ORDER — ONDANSETRON HYDROCHLORIDE 2 MG/ML
INJECTION, SOLUTION INTRAVENOUS
Status: DISCONTINUED | OUTPATIENT
Start: 2025-04-24 | End: 2025-04-24

## 2025-04-24 RX ORDER — POLYETHYLENE GLYCOL 3350 17 G/17G
17 POWDER, FOR SOLUTION ORAL DAILY
Start: 2025-04-24 | End: 2025-05-24

## 2025-04-24 RX ORDER — HALOPERIDOL LACTATE 5 MG/ML
0.5 INJECTION, SOLUTION INTRAMUSCULAR EVERY 10 MIN PRN
Status: DISCONTINUED | OUTPATIENT
Start: 2025-04-24 | End: 2025-04-24 | Stop reason: HOSPADM

## 2025-04-24 RX ORDER — OXYCODONE HYDROCHLORIDE 5 MG/1
5 TABLET ORAL ONCE
Refills: 0 | Status: COMPLETED | OUTPATIENT
Start: 2025-04-24 | End: 2025-04-24

## 2025-04-24 RX ORDER — MUPIROCIN 20 MG/G
OINTMENT TOPICAL
Status: DISCONTINUED | OUTPATIENT
Start: 2025-04-24 | End: 2025-04-24 | Stop reason: HOSPADM

## 2025-04-24 RX ORDER — BUPIVACAINE HYDROCHLORIDE 2.5 MG/ML
INJECTION, SOLUTION EPIDURAL; INFILTRATION; INTRACAUDAL; PERINEURAL
Status: DISCONTINUED | OUTPATIENT
Start: 2025-04-24 | End: 2025-04-24 | Stop reason: HOSPADM

## 2025-04-24 RX ORDER — MIDAZOLAM HYDROCHLORIDE 1 MG/ML
INJECTION INTRAMUSCULAR; INTRAVENOUS
Status: DISCONTINUED | OUTPATIENT
Start: 2025-04-24 | End: 2025-04-24

## 2025-04-24 RX ORDER — PROPOFOL 10 MG/ML
VIAL (ML) INTRAVENOUS
Status: DISCONTINUED | OUTPATIENT
Start: 2025-04-24 | End: 2025-04-24

## 2025-04-24 RX ORDER — SODIUM CHLORIDE 9 MG/ML
INJECTION, SOLUTION INTRAVENOUS CONTINUOUS
Status: DISCONTINUED | OUTPATIENT
Start: 2025-04-24 | End: 2025-04-24 | Stop reason: HOSPADM

## 2025-04-24 RX ORDER — FENTANYL CITRATE 50 UG/ML
INJECTION, SOLUTION INTRAMUSCULAR; INTRAVENOUS
Status: DISCONTINUED | OUTPATIENT
Start: 2025-04-24 | End: 2025-04-24

## 2025-04-24 RX ORDER — DEXAMETHASONE SODIUM PHOSPHATE 4 MG/ML
INJECTION, SOLUTION INTRA-ARTICULAR; INTRALESIONAL; INTRAMUSCULAR; INTRAVENOUS; SOFT TISSUE
Status: DISCONTINUED | OUTPATIENT
Start: 2025-04-24 | End: 2025-04-24

## 2025-04-24 RX ORDER — OXYCODONE HYDROCHLORIDE 5 MG/1
5 TABLET ORAL EVERY 4 HOURS PRN
Qty: 20 TABLET | Refills: 0 | Status: SHIPPED | OUTPATIENT
Start: 2025-04-24

## 2025-04-24 RX ORDER — ROCURONIUM BROMIDE 10 MG/ML
INJECTION, SOLUTION INTRAVENOUS
Status: DISCONTINUED | OUTPATIENT
Start: 2025-04-24 | End: 2025-04-24

## 2025-04-24 RX ORDER — GLUCAGON 1 MG
1 KIT INJECTION
Status: DISCONTINUED | OUTPATIENT
Start: 2025-04-24 | End: 2025-04-24 | Stop reason: HOSPADM

## 2025-04-24 RX ADMIN — SODIUM CHLORIDE: 0.9 INJECTION, SOLUTION INTRAVENOUS at 06:04

## 2025-04-24 RX ADMIN — OXYCODONE 5 MG: 5 TABLET ORAL at 08:04

## 2025-04-24 RX ADMIN — MUPIROCIN: 20 OINTMENT TOPICAL at 05:04

## 2025-04-24 RX ADMIN — FENTANYL CITRATE 100 MCG: 50 INJECTION, SOLUTION INTRAMUSCULAR; INTRAVENOUS at 07:04

## 2025-04-24 RX ADMIN — SODIUM CHLORIDE: 9 INJECTION, SOLUTION INTRAVENOUS at 05:04

## 2025-04-24 RX ADMIN — PROPOFOL 100 MG: 10 INJECTION, EMULSION INTRAVENOUS at 07:04

## 2025-04-24 RX ADMIN — ROCURONIUM BROMIDE 50 MG: 10 INJECTION, SOLUTION INTRAVENOUS at 07:04

## 2025-04-24 RX ADMIN — LIDOCAINE HYDROCHLORIDE 100 MG: 20 INJECTION INTRAVENOUS at 07:04

## 2025-04-24 RX ADMIN — SUGAMMADEX 200 MG: 100 INJECTION, SOLUTION INTRAVENOUS at 07:04

## 2025-04-24 RX ADMIN — DEXAMETHASONE SODIUM PHOSPHATE 4 MG: 4 INJECTION, SOLUTION INTRAMUSCULAR; INTRAVENOUS at 07:04

## 2025-04-24 RX ADMIN — MIDAZOLAM HYDROCHLORIDE 2 MG: 2 INJECTION, SOLUTION INTRAMUSCULAR; INTRAVENOUS at 06:04

## 2025-04-24 RX ADMIN — ONDANSETRON 4 MG: 2 INJECTION INTRAMUSCULAR; INTRAVENOUS at 07:04

## 2025-04-24 NOTE — ANESTHESIA PROCEDURE NOTES
Intubation    Date/Time: 4/24/2025 7:08 AM    Performed by: Seerne Jackson CRNA  Authorized by: Devon Bhakta MD    Intubation:     Induction:  Intravenous    Intubated:  Postinduction    Mask Ventilation:  Easy mask    Attempts:  1    Attempted By:  CRNA    Method of Intubation:  Video laryngoscopy    Blade:  Barton 3    Laryngeal View Grade: Grade I - full view of cords      Difficult Airway Encountered?: No      Complications:  None    Airway Device:  Oral endotracheal tube    Airway Device Size:  7.5    Style/Cuff Inflation:  Cuffed (inflated to minimal occlusive pressure)    Inflation Amount (mL):  8    Tube secured:  22    Secured at:  The lips    Placement Verified By:  Capnometry    Complicating Factors:  None    Findings Post-Intubation:  BS equal bilateral and atraumatic/condition of teeth unchanged

## 2025-04-24 NOTE — DISCHARGE INSTRUCTIONS
Anal Surgery Post Op Instructions:    You had a cutting seton placement performed today.     Wound care:  Expect some drainage over the next few weeks as the wound heals.  Please wear a pad to help with drainage.   You have stitches in place that will absorb.  They may break down early and cause an open wound - this is very common and it will continue to heal fine.  The rubber loop will stay in place and be progressively tightened in clinic.  Showering is okay starting tomorrow. You can take warm water soaks in your own bathtub (aka sitz baths) to relax tight pelvic muscles and cleanse the area gently after bowel movements. Using a gently bidet, squirt bottle, or shower head can help cleanse the area.  Do not put creams or ointments over the area as it heals.    Medications:  You should take acetaminophen (tylenol) and ibuprofen (motrin) around the clock for the first 24hrs for continued baseline pain control then wean off, but it is safe to continue taking then around the clock for over a week if needed.  For example, take 1000mg tylenol every 6 hours and 600mg motrin every six hours. You can leap-frog these mediations to have continued pain control: tylenol at 9am, motrin at 12pm, tylenol at 3pm, motrin at 6pm, ect. A narcotic pain medication has been prescribed for severe pain.  Please only take this pain medication as needed since this can cause painful constipation and painful bowel movements.  You can resume your home medications.    It is important to take miralax 1 capful at night to reduce discomfort with bowel movements and to prevent constipation associated with narcotic pain medications.  You can add docusate (colace) 200mg twice a day as well. Prune juice or milk of magnesium are also good medications to try if you continue to have constipation. Senna (senakot) can also be added, 2 tabs once a day.    Restrictions:  No swimming or hot tubs until healed.  You have no activity restrictions. Return to  work/school when pain is controlled without narcotics and you feel well to do your job/pay attention in class.  No dietary restrictions. Keep well hydrated to have soft bowel movements.    Follow up:  Return to clinic in about 4 weeks for follow up and wound check. Call [phone] for worsening pain, inability to urinate, or fever > 101.  Call or schedule follow up in about 7 days via [email or web address] if you are not otherwise contacted or see an appointment in the portal.

## 2025-04-24 NOTE — BRIEF OP NOTE
[facility] - [facility] (2nd Fl)  Brief Operative Note    Surgery Date: 4/24/2025     Surgeons and Role:     * Mayuri Velazquez MD - Primary     * Bárbara Amezquita MD - Resident - Assisting        Pre-op Diagnosis:  Fistula-in-ano [K60.30]    Post-op Diagnosis:  Post-Op Diagnosis Codes:     * Fistula-in-ano [K60.30]    Procedure(s) (LRB):  FISTULOTOMY, ANAL (N/A)    Anesthesia: General    Operative Findings: distal and intersphincteric, marsupialized externally and placed a cutting seton vessel loop    Estimated Blood Loss: * No values recorded between 4/24/2025  6:56 AM and 4/24/2025  8:01 AM *         Specimens:   Specimen (24h ago, onward)      None            * No specimens in log *        Discharge Note    OUTCOME: Patient tolerated treatment/procedure well without complication and is now ready for discharge.    DISPOSITION: Home or Self Care    FINAL DIAGNOSIS:  anal fistula    FOLLOWUP: In clinic    DISCHARGE INSTRUCTIONS:      Anal Surgery Post Op Instructions:     You had a cutting seton placement performed today.     Wound care:  Expect some drainage over the next few weeks as the wound heals.  Please wear a pad to help with drainage.   You have stitches in place that will absorb.  They may break down early and cause an open wound - this is very common and it will continue to heal fine.  The rubber loop will stay in place and be progressively tightened in clinic.  Showering is okay starting tomorrow. You can take warm water soaks in your own bathtub (aka sitz baths) to relax tight pelvic muscles and cleanse the area gently after bowel movements. Using a gently bidet, squirt bottle, or shower head can help cleanse the area.  Do not put creams or ointments over the area as it heals.     Medications:  You should take acetaminophen (tylenol) and ibuprofen (motrin) around the clock for the first 24hrs for continued baseline pain control then wean off, but it is safe to continue taking then around the clock for over a  week if needed.  For example, take 1000mg tylenol every 6 hours and 600mg motrin every six hours. You can leap-frog these mediations to have continued pain control: tylenol at 9am, motrin at 12pm, tylenol at 3pm, motrin at 6pm, ect. A narcotic pain medication has been prescribed for severe pain.  Please only take this pain medication as needed since this can cause painful constipation and painful bowel movements.  You can resume your home medications.     It is important to take miralax 1 capful at night to reduce discomfort with bowel movements and to prevent constipation associated with narcotic pain medications.  You can add docusate (colace) 200mg twice a day as well. Prune juice or milk of magnesium are also good medications to try if you continue to have constipation. Senna (senakot) can also be added, 2 tabs once a day.     Restrictions:  No swimming or hot tubs until healed.  You have no activity restrictions. Return to work/school when pain is controlled without narcotics and you feel well to do your job/pay attention in class.  No dietary restrictions. Keep well hydrated to have soft bowel movements.     Follow up:  Return to clinic in about 4 weeks for follow up and wound check. Call [phone] for worsening pain, inability to urinate, or fever > 101.  Call or schedule follow up in about 7 days via [employer] if you are not otherwise contacted or see an appointment in the portal.       Medication List        START taking these medications      polyethylene glycol 17 gram Pwpk  Commonly known as: GLYCOLAX  Take 17 g by mouth once daily.            CONTINUE taking these medications      FLOMAX ORAL     levothyroxine 88 mcg Cap  Commonly known as: TIROSINT     oxyCODONE 5 MG immediate release tablet  Commonly known as: ROXICODONE  Take 1 tablet (5 mg total) by mouth every 4 (four) hours as needed for Pain.            STOP taking these medications      amoxicillin-clavulanate 875-125mg 875-125 mg per  tablet  Commonly known as: AUGMENTIN     buPROPion 150 MG TB24 tablet  Commonly known as: WELLBUTRIN XL     buPROPion 75 MG tablet  Commonly known as: WELLBUTRIN               Where to Get Your Medications        These medications were sent to [facility]  [address], [place] [address]      Hours: Always Open Phone: [phone]   oxyCODONE 5 MG immediate release tablet       Information about where to get these medications is not yet available    Ask your nurse or doctor about these medications  polyethylene glycol 17 gram Pwpk

## 2025-04-24 NOTE — H&P
H&P reviewed, no changes.  ----    [facility]     HPI:   Rhea Archuleta is a 59 y.o. female who presents for follow up of fistula in ano     7/19/24 EUA with debridement of perirectal abscess cavity     12/26/24 EUA with placement of seton     Interval history:   Has been doing well with seton.  Small amount of drainage daily.         Past Medical History:   Diagnosis Date    Abnormal Pap smear of cervix     Thyroid disease        Past Surgical History:   Procedure Laterality Date    CERVICAL BIOPSY  W/ LOOP ELECTRODE EXCISION      EXAMINATION UNDER ANESTHESIA N/A 7/19/2024    Procedure: Exam under anesthesia, I&D OF PERIRECTAL ABSCESS, FLEXIBLE SIGMOIDOSCOPY;  Surgeon: Mayuri Velazquez MD;  Location: [facility], [place], operated by [facility] OR;  Service: Colon and Rectal;  Laterality: N/A;    EXAMINATION UNDER ANESTHESIA N/A 12/26/2024    Procedure: Exam under anesthesia with possible fistulotomy;  Surgeon: Mayuri Velazquez MD;  Location: [facility] OR 2ND FLR;  Service: Colon and Rectal;  Laterality: N/A;    FLEXIBLE SIGMOIDOSCOPY N/A 7/19/2024    Procedure: SIGMOIDOSCOPY, FLEXIBLE;  Surgeon: Mayuri Velazquez MD;  Location: [facility], [place], operated by [facility] OR;  Service: Colon and Rectal;  Laterality: N/A;    INSERTION OF SETON STITCH  12/26/2024    Procedure: PLACEMENT, SETON STITCH;  Surgeon: Mayuri Velazquez MD;  Location: [facility] OR 2ND FLR;  Service: Colon and Rectal;;    TUBAL LIGATION         Family History   Problem Relation Name Age of Onset    Hypertension Mother      Osteoporosis Mother         Social History     Socioeconomic History    Marital status: Single   Tobacco Use    Smoking status: Former     Types: Cigarettes    Smokeless tobacco: Never    Tobacco comments:     Quit 2019   Substance and Sexual Activity    Alcohol use: Not Currently    Drug use: Never    Sexual activity: Not Currently     Partners: Male     Social Drivers of Health     Financial Resource Strain: Low Risk  (7/17/2024)    Overall Financial Resource Strain (CARDIA)     Difficulty of  Paying Living Expenses: Not hard at all   Food Insecurity: No Food Insecurity (7/17/2024)    Hunger Vital Sign     Worried About Running Out of Food in the Last Year: Never true     Ran Out of Food in the Last Year: Never true   Transportation Needs: No Transportation Needs (12/28/2023)    Received from [facility]    PRAPARE - Transportation     Lack of Transportation (Medical): No     Lack of Transportation (Non-Medical): No   Physical Activity: Sufficiently Active (7/17/2024)    Exercise Vital Sign     Days of Exercise per Week: 2 days     Minutes of Exercise per Session: 90 min   Stress: No Stress Concern Present (7/17/2024)    [employer] - Occupational Stress Questionnaire     Feeling of Stress : Only a little   Housing Stability: Unknown (7/17/2024)    Housing Stability Vital Sign     Unable to Pay for Housing in the Last Year: No       Current Medications[1]    Review of patient's allergies indicates:   Allergen Reactions    Clarithromycin Hives and Other (See Comments)     unknown         Objective:       Vitals:     03/07/25 0939   BP: (!) 153/71   Pulse: 61         Physical Exam   Gen: well developed female, NAD  HEENT: normocepahlic, atraumatic, PERRL, EOMI  CV: RRR, no murmurs  Resp: nonlabored, CTAB   Abd: soft, NTND   MSK: no gross deformities, no cyanosis or edema   Anorectal: seton in place in right anterolateral position      Assessment and Plan:   Rhea Archuleta  is a 59 y.o. female who presents for follow up of fistula in ano      We reviewed the cryptoglandular etiology of anorectal abscess and fistula-in-ano using visual diagrams. We reviewed that ~20% of patients who have had an abscess will develop a fistula.      We reviewed treatment options:  (1) Clinical observation- this would avoid the possible need for multiple procedures, but will a low likelihood that the fistula wound  resolve.  (2) Exam under anesthesia- at this time, we reviewed that if <30% of the sphincter were involved I would perform a fistulotomy, which is associated with >90% healing.  If >30% of the sphincter were involved , I would place a seton.  This would remain in place for at least 3 months, and would then require a second procedure for repair (advancement flap or ligation of intersphincteric fistula [LIFT]).  We reviewed that success rates for these procedures are ~60-70%.      We reviewed expectations following the procedure, including pain, bleeding, possible changes in continence to gas or liquid stool.  Asked if any additional questions, none at this time.     Given anterior location of fistula, will plan for LIFT vs. Endorectal advancement flap.  Consent signed.         Mayuri Velazquez MD, FACS   Staff Surgeon   Colon & Rectal Surgery         [1]   Current Facility-Administered Medications   Medication Dose Route Frequency Provider Last Rate Last Admin    0.9% NaCl infusion   Intravenous Continuous Geeta Kilgore NP 70 mL/hr at 04/24/25 0548 New Bag at 04/24/25 0548    mupirocin 2 % ointment   Nasal On Call Procedure Geeta Kilgore NP   Given at 04/24/25 0548

## 2025-04-24 NOTE — OP NOTE
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 Archuleta is a 59 year old woman with a history of recurrent perianal abscess and fistula in ano  who is scheduled to undergo 2nd stage fistula repair. The benefits, risks, and alternatives were discussed with the patient, they were given the opportunity to ask questions and they elected to proceed with operative intervention after signing written consent.    Procedure:  After pre-operative assessment and review of informed consent, the patient was taken to the operating room and received general anesthesia. Pre-operative antibiotics were administered if indicated and the patient was placed in lithotomy position. The perineum was prepped and draped in the usual sterile fashion and a timeout was performed according to [facility] Quality and Safety guidelines.      A perianal block was performed with 0.25% bupivacaine.  Anoscopy was performed with a [place] retractor.  A fistula probe was placed through the tract and the seton removed.  We made an elliptical incision around the external opening and excised the scar.  There was granulation tissue that was debrided with a curette.  We identified the internal sphincter muscle and confirmed this was an intersphincteric fistula.  The tract involved a significant amount of muscle.  However, the internal defect was not amenable to an endorectal advancement flap.  We shortened the tract with electrocautery and divided the epithelium over the muscle.  We then placed a vessel loop around the muscle and secured it in place with silk ties.  The wound edges were marsupialized with interrupted vicryl sutures.  The wound was irrigated and  hemostasis obtained.        Prior to closure and after final closure instrument, needle, and sponge counts were correct.    The procedure was completed without complication and was well-tolerated. The patient was then brought to the post-anesthesia care unit in stable condition. I was present for the entire operation.    Complications: None  Estimated blood loss: 10 mL  Disposition: PACU    Mayuri Velazquez MD, FACS, FASCRS  Staff Surgeon   Colon & Rectal Surgery

## 2025-04-24 NOTE — TRANSFER OF CARE
"Anesthesia Transfer of Care Note    Patient: Rhea Archuleta    Procedure(s) Performed: Procedure(s) (LRB):  FISTULOTOMY, ANAL (N/A)  PLACEMENT, SETON STITCH    Patient location: [facility]    Anesthesia Type: general    Transport from OR: Transported from OR on 6-10 L/min O2 by face mask with adequate spontaneous ventilation    Post pain: adequate analgesia    Post assessment: no apparent anesthetic complications and tolerated procedure well    Post vital signs: stable    Level of consciousness: awake and alert    Nausea/Vomiting: no nausea/vomiting    Complications: none    Transfer of care protocol was followed      Last vitals: Visit Vitals  BP (!) 141/71 (BP Location: Left arm, Patient Position: Lying)   Pulse (!) 56   Temp 36.4 °C (97.5 °F)   Resp (!) 23   Ht 5' 7" (1.702 m)   Wt 91.4 kg (201 lb 8 oz)   SpO2 (!) 94%   Breastfeeding No   BMI 31.56 kg/m²     "

## 2025-04-24 NOTE — PLAN OF CARE
Discharge instructions given and explained to patient and family with verbalization of understanding all instructions.  Patients v/s stable, denies n/v and tolerating po, rates pain level tolerable, IV removed, and family at bedside for patient discharge home.

## 2025-04-24 NOTE — ANESTHESIA POSTPROCEDURE EVALUATION
Anesthesia Post Evaluation    Patient: Rhea Archuleta    Procedure(s) Performed: Procedure(s) (LRB):  FISTULOTOMY, ANAL (N/A)  PLACEMENT, SETON STITCH    Final Anesthesia Type: general      Patient location during evaluation: PACU  Patient participation: Yes- Able to Participate  Level of consciousness: awake and alert and oriented  Post-procedure vital signs: reviewed and stable  Pain management: adequate  Airway patency: patent    PONV status at discharge: No PONV  Anesthetic complications: no      Cardiovascular status: blood pressure returned to baseline  Respiratory status: unassisted, room air and spontaneous ventilation  Hydration status: euvolemic  Follow-up not needed.              Vitals Value Taken Time   /76 04/24/25 09:01   Temp 36.4 °C (97.5 °F) 04/24/25 08:03   Pulse 64 04/24/25 09:01   Resp 17 04/24/25 09:01   SpO2 94 % 04/24/25 08:58   Vitals shown include unfiled device data.      No case tracking events are documented in the log.      Pain/Ronel Score: Pain Rating Prior to Med Admin: 6 (4/24/2025  8:25 AM)  Ronel Score: 9 (4/24/2025  8:30 AM)

## 2025-05-05 ENCOUNTER — PATIENT MESSAGE (OUTPATIENT)
Dept: SURGERY | Facility: CLINIC | Age: 60
End: 2025-05-05
Payer: COMMERCIAL

## (undated) DEVICE — LOOP VESSEL BLUE MAXI

## (undated) DEVICE — CATH CATHLON IV 16G 2IN

## (undated) DEVICE — DRAPE TOP 53X102IN

## (undated) DEVICE — Device

## (undated) DEVICE — HYDROGEN PEROXIDE HDX10 3% 4OZ

## (undated) DEVICE — JELLY SURGILUBE 5GR

## (undated) DEVICE — GLOVE SENSICARE PI GRN 6.5

## (undated) DEVICE — SPONGE COTTON TRAY 4X4IN

## (undated) DEVICE — TAPE SILK 3IN

## (undated) DEVICE — GLOVE BIOGEL ECLIPSE SZ 6

## (undated) DEVICE — LUBRICANT SURGILUBE 2 OZ

## (undated) DEVICE — SOL POVIDONE SCRUB IODINE 4 OZ

## (undated) DEVICE — COUNTER BDL BLADEGUARD LI DBL

## (undated) DEVICE — SYR B-D DISP CONTROL 10CC100/C

## (undated) DEVICE — GOWN ORBIS LVL 4 XXL 49IN

## (undated) DEVICE — SYR 10CC LUER LOCK

## (undated) DEVICE — SUT 2-0 12-18IN SILK

## (undated) DEVICE — SOL IRR SOD CHL .9% POUR

## (undated) DEVICE — JELLY KY LUBRICATING 5G PACKET

## (undated) DEVICE — CATH MALECOT 12FR 6/BX

## (undated) DEVICE — TOWEL OR DISP STRL BLUE 4/PK

## (undated) DEVICE — DRAPE HALF SURGICAL 40X58IN

## (undated) DEVICE — DRAPE UNDERBUTTOCKS PCH STRL

## (undated) DEVICE — NDL HYPO REG 25G X 1 1/2

## (undated) DEVICE — ELECTRODE MEGADYNE RETURN DUAL

## (undated) DEVICE — BOWL STERILE LARGE 32OZ

## (undated) DEVICE — GLOVE BIOGEL SKINSENSE PI 8.0

## (undated) DEVICE — SPONGE LAP 18X18 PREWASHED

## (undated) DEVICE — SOL POVIDONE PREP IODINE 4 OZ

## (undated) DEVICE — TIP YANKAUERS POOLE TIP 72IN

## (undated) DEVICE — LOOP VES MAXI BLU 2.5X1MM 18IN

## (undated) DEVICE — BRIEFS ADULT SEAMLESS LG/XL

## (undated) DEVICE — TRAY DRY SKIN SCRUB PREP

## (undated) DEVICE — PAD ABDOMINAL STERILE 8X10IN

## (undated) DEVICE — TRAY SKIN SCRUB WET PREMIUM

## (undated) DEVICE — GLOVE SENSICARE PI SURG 6

## (undated) DEVICE — LEGGING CLEAR POLY 2/PACK

## (undated) DEVICE — SUT 0 18IN SILK BLK BRAIDE